# Patient Record
Sex: MALE | Race: WHITE | NOT HISPANIC OR LATINO
[De-identification: names, ages, dates, MRNs, and addresses within clinical notes are randomized per-mention and may not be internally consistent; named-entity substitution may affect disease eponyms.]

---

## 2017-01-09 ENCOUNTER — APPOINTMENT (OUTPATIENT)
Dept: UROLOGY | Facility: CLINIC | Age: 72
End: 2017-01-09

## 2017-01-09 ENCOUNTER — OUTPATIENT (OUTPATIENT)
Dept: OUTPATIENT SERVICES | Facility: HOSPITAL | Age: 72
LOS: 1 days | End: 2017-01-09
Payer: COMMERCIAL

## 2017-01-09 DIAGNOSIS — R35.0 FREQUENCY OF MICTURITION: ICD-10-CM

## 2017-01-09 PROCEDURE — 52000 CYSTOURETHROSCOPY: CPT

## 2017-01-17 DIAGNOSIS — C67.9 MALIGNANT NEOPLASM OF BLADDER, UNSPECIFIED: ICD-10-CM

## 2017-03-03 ENCOUNTER — APPOINTMENT (OUTPATIENT)
Dept: RADIOLOGY | Facility: CLINIC | Age: 72
End: 2017-03-03

## 2017-03-03 ENCOUNTER — APPOINTMENT (OUTPATIENT)
Dept: INTERNAL MEDICINE | Facility: CLINIC | Age: 72
End: 2017-03-03
Payer: MEDICARE

## 2017-03-03 VITALS
DIASTOLIC BLOOD PRESSURE: 80 MMHG | TEMPERATURE: 97.8 F | BODY MASS INDEX: 27.74 KG/M2 | HEIGHT: 68 IN | RESPIRATION RATE: 17 BRPM | WEIGHT: 183 LBS | HEART RATE: 58 BPM | SYSTOLIC BLOOD PRESSURE: 151 MMHG | OXYGEN SATURATION: 95 %

## 2017-03-03 DIAGNOSIS — F17.200 NICOTINE DEPENDENCE, UNSPECIFIED, UNCOMPLICATED: ICD-10-CM

## 2017-03-03 DIAGNOSIS — I25.10 ATHEROSCLEROTIC HEART DISEASE OF NATIVE CORONARY ARTERY W/OUT ANGINA PECTORIS: ICD-10-CM

## 2017-03-03 PROCEDURE — 99387 INIT PM E/M NEW PAT 65+ YRS: CPT

## 2017-03-03 RX ORDER — CLOPIDOGREL 75 MG/1
75 TABLET, FILM COATED ORAL
Refills: 0 | Status: ACTIVE | COMMUNITY

## 2017-03-03 RX ORDER — ASPIRIN ENTERIC COATED TABLETS 81 MG 81 MG/1
81 TABLET, DELAYED RELEASE ORAL
Refills: 0 | Status: ACTIVE | COMMUNITY

## 2017-03-03 RX ORDER — METOPROLOL TARTRATE 25 MG/1
25 TABLET, FILM COATED ORAL
Refills: 0 | Status: ACTIVE | COMMUNITY

## 2017-03-03 RX ORDER — ATORVASTATIN CALCIUM 10 MG/1
10 TABLET, FILM COATED ORAL
Refills: 0 | Status: ACTIVE | COMMUNITY

## 2017-03-06 LAB
25(OH)D3 SERPL-MCNC: 33.5 NG/ML
ALBUMIN SERPL ELPH-MCNC: 4.6 G/DL
ALP BLD-CCNC: 75 U/L
ALT SERPL-CCNC: 23 U/L
ANION GAP SERPL CALC-SCNC: 14 MMOL/L
AST SERPL-CCNC: 16 U/L
BASOPHILS # BLD AUTO: 0.02 K/UL
BASOPHILS NFR BLD AUTO: 0.3 %
BILIRUB SERPL-MCNC: 0.5 MG/DL
BUN SERPL-MCNC: 23 MG/DL
CALCIUM SERPL-MCNC: 9.9 MG/DL
CHLORIDE SERPL-SCNC: 103 MMOL/L
CHOLEST SERPL-MCNC: 157 MG/DL
CHOLEST/HDLC SERPL: 4 RATIO
CO2 SERPL-SCNC: 28 MMOL/L
CREAT SERPL-MCNC: 0.95 MG/DL
EOSINOPHIL # BLD AUTO: 0.09 K/UL
EOSINOPHIL NFR BLD AUTO: 1.2 %
GLUCOSE SERPL-MCNC: 118 MG/DL
HBA1C MFR BLD HPLC: 6.8 %
HCT VFR BLD CALC: 48.9 %
HCV AB SER QL: NONREACTIVE
HCV S/CO RATIO: 0.18 S/CO
HDLC SERPL-MCNC: 39 MG/DL
HGB BLD-MCNC: 16.7 G/DL
IMM GRANULOCYTES NFR BLD AUTO: 0.3 %
LDLC SERPL CALC-MCNC: 92 MG/DL
LYMPHOCYTES # BLD AUTO: 2.11 K/UL
LYMPHOCYTES NFR BLD AUTO: 27.7 %
MAN DIFF?: NORMAL
MCHC RBC-ENTMCNC: 32 PG
MCHC RBC-ENTMCNC: 34.2 GM/DL
MCV RBC AUTO: 93.7 FL
MONOCYTES # BLD AUTO: 0.63 K/UL
MONOCYTES NFR BLD AUTO: 8.3 %
NEUTROPHILS # BLD AUTO: 4.74 K/UL
NEUTROPHILS NFR BLD AUTO: 62.2 %
PLATELET # BLD AUTO: 189 K/UL
POTASSIUM SERPL-SCNC: 4.1 MMOL/L
PROT SERPL-MCNC: 7.3 G/DL
PSA SERPL-MCNC: 2.25 NG/ML
RBC # BLD: 5.22 M/UL
RBC # FLD: 13.6 %
SODIUM SERPL-SCNC: 144 MMOL/L
T4 FREE SERPL-MCNC: 1.2 NG/DL
TRIGL SERPL-MCNC: 132 MG/DL
TSH SERPL-ACNC: 2.7 UIU/ML
VIT B12 SERPL-MCNC: 512 PG/ML
WBC # FLD AUTO: 7.61 K/UL

## 2017-03-07 ENCOUNTER — APPOINTMENT (OUTPATIENT)
Dept: RADIOLOGY | Facility: CLINIC | Age: 72
End: 2017-03-07

## 2017-06-21 ENCOUNTER — APPOINTMENT (OUTPATIENT)
Dept: UROLOGY | Facility: CLINIC | Age: 72
End: 2017-06-21

## 2017-06-21 VITALS
HEART RATE: 60 BPM | DIASTOLIC BLOOD PRESSURE: 77 MMHG | TEMPERATURE: 97.8 F | HEIGHT: 68 IN | SYSTOLIC BLOOD PRESSURE: 144 MMHG | RESPIRATION RATE: 17 BRPM | BODY MASS INDEX: 26.83 KG/M2 | WEIGHT: 177 LBS

## 2017-06-28 ENCOUNTER — APPOINTMENT (OUTPATIENT)
Dept: UROLOGY | Facility: CLINIC | Age: 72
End: 2017-06-28

## 2017-06-28 ENCOUNTER — OUTPATIENT (OUTPATIENT)
Dept: OUTPATIENT SERVICES | Facility: HOSPITAL | Age: 72
LOS: 1 days | End: 2017-06-28
Payer: COMMERCIAL

## 2017-06-28 DIAGNOSIS — R35.0 FREQUENCY OF MICTURITION: ICD-10-CM

## 2017-06-28 PROCEDURE — 52000 CYSTOURETHROSCOPY: CPT

## 2017-06-30 DIAGNOSIS — C67.9 MALIGNANT NEOPLASM OF BLADDER, UNSPECIFIED: ICD-10-CM

## 2017-07-03 ENCOUNTER — APPOINTMENT (OUTPATIENT)
Dept: UROLOGY | Facility: CLINIC | Age: 72
End: 2017-07-03

## 2017-07-10 LAB — CORE LAB FLUID CYTOLOGY: NORMAL

## 2018-02-01 ENCOUNTER — LABORATORY RESULT (OUTPATIENT)
Age: 73
End: 2018-02-01

## 2018-02-01 ENCOUNTER — APPOINTMENT (OUTPATIENT)
Dept: INTERNAL MEDICINE | Facility: CLINIC | Age: 73
End: 2018-02-01
Payer: MEDICARE

## 2018-02-01 VITALS
SYSTOLIC BLOOD PRESSURE: 140 MMHG | WEIGHT: 181 LBS | DIASTOLIC BLOOD PRESSURE: 72 MMHG | TEMPERATURE: 97.4 F | HEIGHT: 68 IN | BODY MASS INDEX: 27.43 KG/M2

## 2018-02-01 DIAGNOSIS — I83.90 ASYMPTOMATIC VARICOSE VEINS OF UNSPECIFIED LOWER EXTREMITY: ICD-10-CM

## 2018-02-01 DIAGNOSIS — R12 HEARTBURN: ICD-10-CM

## 2018-02-01 DIAGNOSIS — R05 COUGH: ICD-10-CM

## 2018-02-01 DIAGNOSIS — Z23 ENCOUNTER FOR IMMUNIZATION: ICD-10-CM

## 2018-02-01 PROCEDURE — 36415 COLL VENOUS BLD VENIPUNCTURE: CPT

## 2018-02-01 PROCEDURE — 90670 PCV13 VACCINE IM: CPT

## 2018-02-01 PROCEDURE — G0439: CPT | Mod: 25

## 2018-02-01 PROCEDURE — 99387 INIT PM E/M NEW PAT 65+ YRS: CPT | Mod: 25

## 2018-02-01 PROCEDURE — 93000 ELECTROCARDIOGRAM COMPLETE: CPT

## 2018-02-01 PROCEDURE — G0009: CPT

## 2018-02-03 LAB
25(OH)D3 SERPL-MCNC: 37.1 NG/ML
ALBUMIN SERPL ELPH-MCNC: 4.4 G/DL
ALP BLD-CCNC: 69 U/L
ALT SERPL-CCNC: 22 U/L
ANION GAP SERPL CALC-SCNC: 14 MMOL/L
APPEARANCE: CLEAR
AST SERPL-CCNC: 17 U/L
BASOPHILS # BLD AUTO: 0.02 K/UL
BASOPHILS NFR BLD AUTO: 0.3 %
BILIRUB SERPL-MCNC: 0.5 MG/DL
BILIRUBIN URINE: NEGATIVE
BLOOD URINE: NEGATIVE
BUN SERPL-MCNC: 19 MG/DL
CALCIUM SERPL-MCNC: 10.1 MG/DL
CHLORIDE SERPL-SCNC: 100 MMOL/L
CHOLEST SERPL-MCNC: 165 MG/DL
CHOLEST/HDLC SERPL: 4.7 RATIO
CO2 SERPL-SCNC: 26 MMOL/L
COLOR: ABNORMAL
CREAT SERPL-MCNC: 1.04 MG/DL
EOSINOPHIL # BLD AUTO: 0.13 K/UL
EOSINOPHIL NFR BLD AUTO: 1.6 %
ERYTHROCYTE [SEDIMENTATION RATE] IN BLOOD BY WESTERGREN METHOD: 7 MM/HR
GLUCOSE QUALITATIVE U: NEGATIVE MG/DL
GLUCOSE SERPL-MCNC: 137 MG/DL
HBA1C MFR BLD HPLC: 6.1 %
HCT VFR BLD CALC: 49.9 %
HDLC SERPL-MCNC: 35 MG/DL
HGB BLD-MCNC: 16.1 G/DL
IMM GRANULOCYTES NFR BLD AUTO: 0.3 %
KETONES URINE: ABNORMAL
LDLC SERPL CALC-MCNC: 97 MG/DL
LEUKOCYTE ESTERASE URINE: NEGATIVE
LYMPHOCYTES # BLD AUTO: 2.45 K/UL
LYMPHOCYTES NFR BLD AUTO: 30.6 %
MAN DIFF?: NORMAL
MCHC RBC-ENTMCNC: 30.7 PG
MCHC RBC-ENTMCNC: 32.3 GM/DL
MCV RBC AUTO: 95.2 FL
MONOCYTES # BLD AUTO: 0.72 K/UL
MONOCYTES NFR BLD AUTO: 9 %
NEUTROPHILS # BLD AUTO: 4.66 K/UL
NEUTROPHILS NFR BLD AUTO: 58.2 %
NITRITE URINE: NEGATIVE
PH URINE: 5.5
PLATELET # BLD AUTO: 208 K/UL
POTASSIUM SERPL-SCNC: 4.4 MMOL/L
PROT SERPL-MCNC: 7.5 G/DL
PROTEIN URINE: NEGATIVE MG/DL
PSA SERPL-MCNC: 1.85 NG/ML
RBC # BLD: 5.24 M/UL
RBC # FLD: 13.5 %
SAVE SPECIMEN: NORMAL
SODIUM SERPL-SCNC: 140 MMOL/L
SPECIFIC GRAVITY URINE: 1.03
T3FREE SERPL-MCNC: 3.18 PG/ML
T4 FREE SERPL-MCNC: 1.2 NG/DL
TRIGL SERPL-MCNC: 165 MG/DL
TSH SERPL-ACNC: 3.74 UIU/ML
UROBILINOGEN URINE: NEGATIVE MG/DL
WBC # FLD AUTO: 8 K/UL

## 2018-02-14 ENCOUNTER — APPOINTMENT (OUTPATIENT)
Dept: VASCULAR SURGERY | Facility: CLINIC | Age: 73
End: 2018-02-14
Payer: MEDICARE

## 2018-02-14 VITALS
DIASTOLIC BLOOD PRESSURE: 85 MMHG | HEART RATE: 60 BPM | SYSTOLIC BLOOD PRESSURE: 133 MMHG | TEMPERATURE: 97.6 F | WEIGHT: 180 LBS | HEIGHT: 68 IN | BODY MASS INDEX: 27.28 KG/M2

## 2018-02-14 PROCEDURE — 99204 OFFICE O/P NEW MOD 45 MIN: CPT

## 2018-02-14 PROCEDURE — 99244 OFF/OP CNSLTJ NEW/EST MOD 40: CPT

## 2018-02-15 ENCOUNTER — FORM ENCOUNTER (OUTPATIENT)
Age: 73
End: 2018-02-15

## 2018-02-16 ENCOUNTER — OUTPATIENT (OUTPATIENT)
Dept: OUTPATIENT SERVICES | Facility: HOSPITAL | Age: 73
LOS: 1 days | End: 2018-02-16
Payer: MEDICARE

## 2018-02-16 ENCOUNTER — APPOINTMENT (OUTPATIENT)
Dept: RADIOLOGY | Facility: CLINIC | Age: 73
End: 2018-02-16
Payer: MEDICARE

## 2018-02-16 DIAGNOSIS — F17.200 NICOTINE DEPENDENCE, UNSPECIFIED, UNCOMPLICATED: ICD-10-CM

## 2018-02-16 PROCEDURE — 71046 X-RAY EXAM CHEST 2 VIEWS: CPT | Mod: 26

## 2018-02-16 PROCEDURE — 71046 X-RAY EXAM CHEST 2 VIEWS: CPT

## 2018-03-19 ENCOUNTER — APPOINTMENT (OUTPATIENT)
Dept: VASCULAR SURGERY | Facility: CLINIC | Age: 73
End: 2018-03-19
Payer: MEDICARE

## 2018-03-19 ENCOUNTER — MOBILE ON CALL (OUTPATIENT)
Age: 73
End: 2018-03-19

## 2018-03-19 VITALS
TEMPERATURE: 97.6 F | WEIGHT: 180 LBS | DIASTOLIC BLOOD PRESSURE: 69 MMHG | BODY MASS INDEX: 27.28 KG/M2 | HEIGHT: 68 IN | HEART RATE: 56 BPM | SYSTOLIC BLOOD PRESSURE: 156 MMHG

## 2018-03-19 PROCEDURE — 93970 EXTREMITY STUDY: CPT

## 2018-03-19 PROCEDURE — 99213 OFFICE O/P EST LOW 20 MIN: CPT

## 2018-05-29 ENCOUNTER — RX RENEWAL (OUTPATIENT)
Age: 73
End: 2018-05-29

## 2018-05-29 DIAGNOSIS — I83.93 ASYMPTOMATIC VARICOSE VEINS OF BILATERAL LOWER EXTREMITIES: ICD-10-CM

## 2018-06-04 ENCOUNTER — APPOINTMENT (OUTPATIENT)
Dept: VASCULAR SURGERY | Facility: CLINIC | Age: 73
End: 2018-06-04
Payer: MEDICARE

## 2018-06-04 PROCEDURE — 36475 ENDOVENOUS RF 1ST VEIN: CPT | Mod: LT

## 2018-06-08 ENCOUNTER — APPOINTMENT (OUTPATIENT)
Dept: VASCULAR SURGERY | Facility: CLINIC | Age: 73
End: 2018-06-08
Payer: MEDICARE

## 2018-06-08 PROCEDURE — 93971 EXTREMITY STUDY: CPT

## 2018-06-11 ENCOUNTER — RX RENEWAL (OUTPATIENT)
Age: 73
End: 2018-06-11

## 2018-06-13 ENCOUNTER — RX RENEWAL (OUTPATIENT)
Age: 73
End: 2018-06-13

## 2018-06-15 ENCOUNTER — APPOINTMENT (OUTPATIENT)
Dept: VASCULAR SURGERY | Facility: CLINIC | Age: 73
End: 2018-06-15
Payer: MEDICARE

## 2018-06-15 PROCEDURE — 37765 STAB PHLEB VEINS XTR 10-20: CPT | Mod: LT

## 2018-06-18 ENCOUNTER — APPOINTMENT (OUTPATIENT)
Dept: VASCULAR SURGERY | Facility: CLINIC | Age: 73
End: 2018-06-18
Payer: MEDICARE

## 2018-06-18 PROCEDURE — 37765 STAB PHLEB VEINS XTR 10-20: CPT | Mod: 79,RT

## 2018-06-27 ENCOUNTER — APPOINTMENT (OUTPATIENT)
Dept: UROLOGY | Facility: CLINIC | Age: 73
End: 2018-06-27
Payer: MEDICARE

## 2018-06-27 PROCEDURE — 99213 OFFICE O/P EST LOW 20 MIN: CPT

## 2018-07-02 ENCOUNTER — OUTPATIENT (OUTPATIENT)
Dept: OUTPATIENT SERVICES | Facility: HOSPITAL | Age: 73
LOS: 1 days | End: 2018-07-02
Payer: MEDICARE

## 2018-07-02 ENCOUNTER — APPOINTMENT (OUTPATIENT)
Dept: UROLOGY | Facility: CLINIC | Age: 73
End: 2018-07-02
Payer: MEDICARE

## 2018-07-02 VITALS
SYSTOLIC BLOOD PRESSURE: 163 MMHG | RESPIRATION RATE: 17 BRPM | DIASTOLIC BLOOD PRESSURE: 73 MMHG | TEMPERATURE: 97.7 F | HEART RATE: 60 BPM

## 2018-07-02 PROCEDURE — 52000 CYSTOURETHROSCOPY: CPT

## 2018-07-06 DIAGNOSIS — C67.4 MALIGNANT NEOPLASM OF POSTERIOR WALL OF BLADDER: ICD-10-CM

## 2018-07-11 ENCOUNTER — APPOINTMENT (OUTPATIENT)
Dept: VASCULAR SURGERY | Facility: CLINIC | Age: 73
End: 2018-07-11
Payer: MEDICARE

## 2018-07-11 VITALS
SYSTOLIC BLOOD PRESSURE: 151 MMHG | HEART RATE: 57 BPM | DIASTOLIC BLOOD PRESSURE: 82 MMHG | BODY MASS INDEX: 27.28 KG/M2 | TEMPERATURE: 97.9 F | HEIGHT: 68 IN | WEIGHT: 180 LBS

## 2018-07-11 PROCEDURE — 99024 POSTOP FOLLOW-UP VISIT: CPT

## 2018-07-11 RX ORDER — ALPRAZOLAM 0.25 MG/1
0.25 TABLET ORAL
Qty: 1 | Refills: 0 | Status: COMPLETED | COMMUNITY
Start: 2018-05-29 | End: 2018-06-18

## 2018-07-16 ENCOUNTER — APPOINTMENT (OUTPATIENT)
Dept: UROLOGY | Facility: CLINIC | Age: 73
End: 2018-07-16

## 2018-09-06 ENCOUNTER — APPOINTMENT (OUTPATIENT)
Dept: INTERNAL MEDICINE | Facility: CLINIC | Age: 73
End: 2018-09-06
Payer: MEDICARE

## 2018-09-06 VITALS
WEIGHT: 180 LBS | SYSTOLIC BLOOD PRESSURE: 120 MMHG | HEIGHT: 68 IN | DIASTOLIC BLOOD PRESSURE: 70 MMHG | TEMPERATURE: 97.7 F | BODY MASS INDEX: 27.28 KG/M2

## 2018-09-06 DIAGNOSIS — R10.9 UNSPECIFIED ABDOMINAL PAIN: ICD-10-CM

## 2018-09-06 DIAGNOSIS — R07.81 PLEURODYNIA: ICD-10-CM

## 2018-09-06 PROCEDURE — 81002 URINALYSIS NONAUTO W/O SCOPE: CPT

## 2018-09-06 PROCEDURE — 36415 COLL VENOUS BLD VENIPUNCTURE: CPT

## 2018-09-06 PROCEDURE — 99214 OFFICE O/P EST MOD 30 MIN: CPT | Mod: 25

## 2018-09-10 ENCOUNTER — APPOINTMENT (OUTPATIENT)
Dept: RADIOLOGY | Facility: CLINIC | Age: 73
End: 2018-09-10
Payer: MEDICARE

## 2018-09-10 ENCOUNTER — OUTPATIENT (OUTPATIENT)
Dept: OUTPATIENT SERVICES | Facility: HOSPITAL | Age: 73
LOS: 1 days | End: 2018-09-10
Payer: MEDICARE

## 2018-09-10 ENCOUNTER — APPOINTMENT (OUTPATIENT)
Dept: ULTRASOUND IMAGING | Facility: CLINIC | Age: 73
End: 2018-09-10
Payer: MEDICARE

## 2018-09-10 DIAGNOSIS — Z00.8 ENCOUNTER FOR OTHER GENERAL EXAMINATION: ICD-10-CM

## 2018-09-10 PROCEDURE — 71100 X-RAY EXAM RIBS UNI 2 VIEWS: CPT | Mod: 26,LT

## 2018-09-10 PROCEDURE — 76700 US EXAM ABDOM COMPLETE: CPT

## 2018-09-10 PROCEDURE — 76700 US EXAM ABDOM COMPLETE: CPT | Mod: 26

## 2018-09-10 PROCEDURE — 71100 X-RAY EXAM RIBS UNI 2 VIEWS: CPT

## 2018-09-11 LAB
ALBUMIN SERPL ELPH-MCNC: 4.2 G/DL
ALP BLD-CCNC: 67 U/L
ALT SERPL-CCNC: 15 U/L
ANION GAP SERPL CALC-SCNC: 14 MMOL/L
APPEARANCE: CLEAR
AST SERPL-CCNC: 16 U/L
BACTERIA: NEGATIVE
BILIRUB SERPL-MCNC: 0.5 MG/DL
BILIRUB UR QL STRIP: ABNORMAL
BILIRUBIN URINE: ABNORMAL
BLOOD URINE: NEGATIVE
BUN SERPL-MCNC: 29 MG/DL
CALCIUM SERPL-MCNC: 9.8 MG/DL
CHLORIDE SERPL-SCNC: 104 MMOL/L
CO2 SERPL-SCNC: 26 MMOL/L
COLOR: ABNORMAL
CREAT SERPL-MCNC: 0.93 MG/DL
GLUCOSE QUALITATIVE U: NEGATIVE MG/DL
GLUCOSE SERPL-MCNC: 128 MG/DL
GLUCOSE UR-MCNC: NEGATIVE
HCG UR QL: 1 EU/DL
HGB UR QL STRIP.AUTO: ABNORMAL
HYALINE CASTS: 4 /LPF
KETONES UR-MCNC: NEGATIVE
KETONES URINE: ABNORMAL
LEUKOCYTE ESTERASE UR QL STRIP: NEGATIVE
LEUKOCYTE ESTERASE URINE: NEGATIVE
MICROSCOPIC-UA: NORMAL
NITRITE UR QL STRIP: NEGATIVE
NITRITE URINE: NEGATIVE
PH UR STRIP: 6
PH URINE: 5.5
POTASSIUM SERPL-SCNC: 4.5 MMOL/L
PROT SERPL-MCNC: 7.2 G/DL
PROT UR STRIP-MCNC: ABNORMAL
PROTEIN URINE: ABNORMAL MG/DL
RED BLOOD CELLS URINE: 8 /HPF
SODIUM SERPL-SCNC: 144 MMOL/L
SP GR UR STRIP: 1.02
SPECIFIC GRAVITY URINE: 1.03
SQUAMOUS EPITHELIAL CELLS: 2 /HPF
UROBILINOGEN URINE: 1 MG/DL
WHITE BLOOD CELLS URINE: 2 /HPF

## 2018-09-12 ENCOUNTER — OTHER (OUTPATIENT)
Age: 73
End: 2018-09-12

## 2018-09-12 ENCOUNTER — APPOINTMENT (OUTPATIENT)
Dept: UROLOGY | Facility: CLINIC | Age: 73
End: 2018-09-12
Payer: MEDICARE

## 2018-09-12 PROCEDURE — 99213 OFFICE O/P EST LOW 20 MIN: CPT

## 2018-09-13 ENCOUNTER — FORM ENCOUNTER (OUTPATIENT)
Age: 73
End: 2018-09-13

## 2018-09-13 LAB
APPEARANCE: CLEAR
BACTERIA: NEGATIVE
BILIRUBIN URINE: NEGATIVE
BLOOD URINE: NEGATIVE
COLOR: YELLOW
GLUCOSE QUALITATIVE U: NEGATIVE MG/DL
HYALINE CASTS: 0 /LPF
KETONES URINE: NEGATIVE
LEUKOCYTE ESTERASE URINE: NEGATIVE
MICROSCOPIC-UA: NORMAL
NITRITE URINE: NEGATIVE
PH URINE: 7.5
PROTEIN URINE: NEGATIVE MG/DL
RED BLOOD CELLS URINE: 1 /HPF
SPECIFIC GRAVITY URINE: 1.02
SQUAMOUS EPITHELIAL CELLS: 0 /HPF
UROBILINOGEN URINE: 1 MG/DL
WHITE BLOOD CELLS URINE: 1 /HPF

## 2018-09-14 ENCOUNTER — APPOINTMENT (OUTPATIENT)
Dept: CT IMAGING | Facility: IMAGING CENTER | Age: 73
End: 2018-09-14
Payer: MEDICARE

## 2018-09-14 ENCOUNTER — OUTPATIENT (OUTPATIENT)
Dept: OUTPATIENT SERVICES | Facility: HOSPITAL | Age: 73
LOS: 1 days | End: 2018-09-14
Payer: MEDICARE

## 2018-09-14 DIAGNOSIS — R31.0 GROSS HEMATURIA: ICD-10-CM

## 2018-09-14 DIAGNOSIS — C67.9 MALIGNANT NEOPLASM OF BLADDER, UNSPECIFIED: ICD-10-CM

## 2018-09-14 LAB — BACTERIA UR CULT: NORMAL

## 2018-09-14 PROCEDURE — 74178 CT ABD&PLV WO CNTR FLWD CNTR: CPT

## 2018-09-14 PROCEDURE — 74178 CT ABD&PLV WO CNTR FLWD CNTR: CPT | Mod: 26

## 2019-06-03 ENCOUNTER — APPOINTMENT (OUTPATIENT)
Dept: UROLOGY | Facility: CLINIC | Age: 74
End: 2019-06-03
Payer: MEDICARE

## 2019-06-03 ENCOUNTER — OUTPATIENT (OUTPATIENT)
Dept: OUTPATIENT SERVICES | Facility: HOSPITAL | Age: 74
LOS: 1 days | End: 2019-06-03
Payer: MEDICARE

## 2019-06-03 VITALS
OXYGEN SATURATION: 95 % | DIASTOLIC BLOOD PRESSURE: 69 MMHG | SYSTOLIC BLOOD PRESSURE: 154 MMHG | RESPIRATION RATE: 17 BRPM | TEMPERATURE: 97.7 F | BODY MASS INDEX: 27.28 KG/M2 | HEART RATE: 62 BPM | WEIGHT: 180 LBS | HEIGHT: 68 IN

## 2019-06-03 DIAGNOSIS — R35.0 FREQUENCY OF MICTURITION: ICD-10-CM

## 2019-06-03 PROCEDURE — 52000 CYSTOURETHROSCOPY: CPT

## 2019-06-10 DIAGNOSIS — C67.9 MALIGNANT NEOPLASM OF BLADDER, UNSPECIFIED: ICD-10-CM

## 2020-03-18 ENCOUNTER — APPOINTMENT (OUTPATIENT)
Dept: INTERNAL MEDICINE | Facility: CLINIC | Age: 75
End: 2020-03-18

## 2020-03-26 ENCOUNTER — APPOINTMENT (OUTPATIENT)
Dept: INTERNAL MEDICINE | Facility: CLINIC | Age: 75
End: 2020-03-26

## 2020-04-24 ENCOUNTER — APPOINTMENT (OUTPATIENT)
Dept: INTERNAL MEDICINE | Facility: CLINIC | Age: 75
End: 2020-04-24
Payer: MEDICARE

## 2020-04-24 PROCEDURE — 99213 OFFICE O/P EST LOW 20 MIN: CPT | Mod: 95

## 2020-04-30 ENCOUNTER — NON-APPOINTMENT (OUTPATIENT)
Age: 75
End: 2020-04-30

## 2020-04-30 ENCOUNTER — APPOINTMENT (OUTPATIENT)
Dept: INTERNAL MEDICINE | Facility: CLINIC | Age: 75
End: 2020-04-30
Payer: MEDICARE

## 2020-04-30 VITALS
TEMPERATURE: 96.7 F | HEIGHT: 68 IN | DIASTOLIC BLOOD PRESSURE: 80 MMHG | WEIGHT: 195 LBS | OXYGEN SATURATION: 96 % | SYSTOLIC BLOOD PRESSURE: 130 MMHG | BODY MASS INDEX: 29.55 KG/M2 | HEART RATE: 63 BPM

## 2020-04-30 PROCEDURE — 99214 OFFICE O/P EST MOD 30 MIN: CPT | Mod: 25

## 2020-04-30 PROCEDURE — 93000 ELECTROCARDIOGRAM COMPLETE: CPT

## 2020-04-30 PROCEDURE — 36415 COLL VENOUS BLD VENIPUNCTURE: CPT

## 2020-05-05 ENCOUNTER — APPOINTMENT (OUTPATIENT)
Dept: INTERNAL MEDICINE | Facility: CLINIC | Age: 75
End: 2020-05-05
Payer: MEDICARE

## 2020-05-05 LAB
ALBUMIN SERPL ELPH-MCNC: 4.3 G/DL
ALP BLD-CCNC: 70 U/L
ALT SERPL-CCNC: 21 U/L
ANION GAP SERPL CALC-SCNC: 12 MMOL/L
AST SERPL-CCNC: 13 U/L
BASOPHILS # BLD AUTO: 0.04 K/UL
BASOPHILS NFR BLD AUTO: 0.6 %
BILIRUB SERPL-MCNC: 0.7 MG/DL
BUN SERPL-MCNC: 28 MG/DL
CALCIUM SERPL-MCNC: 9.7 MG/DL
CHLORIDE SERPL-SCNC: 105 MMOL/L
CO2 SERPL-SCNC: 28 MMOL/L
CREAT SERPL-MCNC: 1.02 MG/DL
EOSINOPHIL # BLD AUTO: 0.08 K/UL
EOSINOPHIL NFR BLD AUTO: 1.1 %
ESTIMATED AVERAGE GLUCOSE: 146 MG/DL
GLUCOSE SERPL-MCNC: 164 MG/DL
HBA1C MFR BLD HPLC: 6.7 %
HCT VFR BLD CALC: 45.6 %
HGB BLD-MCNC: 15.1 G/DL
IMM GRANULOCYTES NFR BLD AUTO: 0.7 %
LYMPHOCYTES # BLD AUTO: 1.96 K/UL
LYMPHOCYTES NFR BLD AUTO: 28.2 %
MAN DIFF?: NORMAL
MCHC RBC-ENTMCNC: 30.1 PG
MCHC RBC-ENTMCNC: 33.1 GM/DL
MCV RBC AUTO: 90.8 FL
MONOCYTES # BLD AUTO: 0.56 K/UL
MONOCYTES NFR BLD AUTO: 8 %
NEUTROPHILS # BLD AUTO: 4.27 K/UL
NEUTROPHILS NFR BLD AUTO: 61.4 %
PLATELET # BLD AUTO: 202 K/UL
POTASSIUM SERPL-SCNC: 4.6 MMOL/L
PROT SERPL-MCNC: 6.7 G/DL
RBC # BLD: 5.02 M/UL
RBC # FLD: 13.2 %
SODIUM SERPL-SCNC: 145 MMOL/L
WBC # FLD AUTO: 6.96 K/UL

## 2020-05-05 PROCEDURE — 99442: CPT

## 2020-05-07 ENCOUNTER — APPOINTMENT (OUTPATIENT)
Dept: INTERNAL MEDICINE | Facility: CLINIC | Age: 75
End: 2020-05-07

## 2020-06-03 ENCOUNTER — APPOINTMENT (OUTPATIENT)
Dept: INTERNAL MEDICINE | Facility: CLINIC | Age: 75
End: 2020-06-03
Payer: MEDICARE

## 2020-06-03 VITALS
HEIGHT: 68 IN | SYSTOLIC BLOOD PRESSURE: 150 MMHG | OXYGEN SATURATION: 97 % | HEART RATE: 64 BPM | DIASTOLIC BLOOD PRESSURE: 70 MMHG | TEMPERATURE: 98.7 F | BODY MASS INDEX: 30.01 KG/M2 | WEIGHT: 198 LBS

## 2020-06-03 DIAGNOSIS — H26.9 UNSPECIFIED CATARACT: ICD-10-CM

## 2020-06-03 PROCEDURE — 99213 OFFICE O/P EST LOW 20 MIN: CPT

## 2020-06-03 PROCEDURE — 99214 OFFICE O/P EST MOD 30 MIN: CPT

## 2020-06-08 ENCOUNTER — APPOINTMENT (OUTPATIENT)
Dept: INTERNAL MEDICINE | Facility: CLINIC | Age: 75
End: 2020-06-08
Payer: MEDICARE

## 2020-06-08 VITALS
SYSTOLIC BLOOD PRESSURE: 140 MMHG | TEMPERATURE: 97.5 F | DIASTOLIC BLOOD PRESSURE: 78 MMHG | WEIGHT: 196 LBS | HEIGHT: 68 IN | BODY MASS INDEX: 29.7 KG/M2 | HEART RATE: 66 BPM | OXYGEN SATURATION: 98 %

## 2020-06-08 VITALS — DIASTOLIC BLOOD PRESSURE: 78 MMHG | SYSTOLIC BLOOD PRESSURE: 138 MMHG

## 2020-06-08 PROCEDURE — 99213 OFFICE O/P EST LOW 20 MIN: CPT

## 2020-06-10 ENCOUNTER — APPOINTMENT (OUTPATIENT)
Dept: UROLOGY | Facility: CLINIC | Age: 75
End: 2020-06-10

## 2020-06-19 ENCOUNTER — APPOINTMENT (OUTPATIENT)
Dept: CARDIOLOGY | Facility: CLINIC | Age: 75
End: 2020-06-19
Payer: MEDICARE

## 2020-06-19 VITALS
BODY MASS INDEX: 30.01 KG/M2 | WEIGHT: 198 LBS | HEART RATE: 54 BPM | OXYGEN SATURATION: 94 % | SYSTOLIC BLOOD PRESSURE: 124 MMHG | DIASTOLIC BLOOD PRESSURE: 66 MMHG | HEIGHT: 68 IN

## 2020-06-19 DIAGNOSIS — Z87.898 PERSONAL HISTORY OF OTHER SPECIFIED CONDITIONS: ICD-10-CM

## 2020-06-19 PROCEDURE — 99204 OFFICE O/P NEW MOD 45 MIN: CPT

## 2020-06-19 RX ORDER — LOSARTAN POTASSIUM AND HYDROCHLOROTHIAZIDE 100; 25 MG/1; MG/1
100-25 TABLET, FILM COATED ORAL
Refills: 0 | Status: DISCONTINUED | COMMUNITY
End: 2020-06-19

## 2020-06-19 RX ORDER — LOSARTAN POTASSIUM 100 MG/1
100 TABLET, FILM COATED ORAL
Refills: 0 | Status: ACTIVE | COMMUNITY
Start: 2020-06-19

## 2020-06-19 RX ORDER — LIDOCAINE HYDROCHLORIDE 10 MG/ML
1 INJECTION, SOLUTION INFILTRATION; PERINEURAL
Qty: 50 | Refills: 0 | Status: DISCONTINUED | COMMUNITY
Start: 2018-05-29 | End: 2020-06-19

## 2020-06-19 RX ORDER — SODIUM CHLORIDE 9 G/ML
0.9 INJECTION, SOLUTION INTRAVENOUS
Qty: 500 | Refills: 0 | Status: DISCONTINUED | COMMUNITY
Start: 2018-05-29 | End: 2020-06-19

## 2020-06-19 RX ORDER — SODIUM BICARBONATE 84 MG/ML
8.4 INJECTION, SOLUTION INTRAVENOUS
Qty: 5 | Refills: 0 | Status: DISCONTINUED | COMMUNITY
Start: 2018-05-29 | End: 2020-06-19

## 2020-06-19 RX ORDER — AMLODIPINE BESYLATE 10 MG/1
10 TABLET ORAL DAILY
Qty: 90 | Refills: 3 | Status: DISCONTINUED | COMMUNITY
End: 2020-06-19

## 2020-06-22 NOTE — ASSESSMENT
[FreeTextEntry1] : 1. PAD on CT As Above\par 2. [place] Class 3 Claudication\par 3. HTN\par 4. CAD s/p PCI\par 5. HLD

## 2020-06-22 NOTE — REASON FOR VISIT
[Initial Evaluation] : an initial evaluation of [FreeTextEntry1] : 75 y/o M Ex-Smoker (Quit Last Year) w/ PMHX HTN, HLD, DM, CAD CAD s/p 5 Stents last at [facility] 10 yrs ago, s/p R eye cataract surgery 6/11/20, for L cataract surgery on 6/25/20, History of Venous Insufficiency s/p B/L GSV ablation and phlebectomy. Patient presents for PAD evaluation. CT 5/2020 showed high grade stenosis of the bilateral internal iliac origin, R CFA and Profunda atherosclerosis, R SFA with focal moderate to severe stenosis, L SFA with with focal occlusion in mid SFA, moderate to severe proximal SFA disease. Patient presents and reports  B/L thigh and calf claudication on ambulating \par 2 blocks, described as a tightness. No leg swelling.No ulcers / tissue loss. Presents for PAD evaluation.\par \par Cardiologist: Conrad Chapman

## 2020-06-22 NOTE — PHYSICAL EXAM
[Normal Conjunctiva] : the conjunctiva exhibited no abnormalities [Heart Sounds] : normal S1 and S2 [Heart Rate And Rhythm] : heart rate and rhythm were normal [Auscultation Breath Sounds / Voice Sounds] : lungs were clear to auscultation bilaterally [Bowel Sounds] : normal bowel sounds [Abdomen Soft] : soft [Abdomen Tenderness] : non-tender [Abnormal Walk] : normal gait [Cyanosis, Localized] : no localized cyanosis [] : no rash [No Skin Ulcers] : no skin ulcer [Oriented To Time, Place, And Person] : oriented to person, place, and time [FreeTextEntry1] : R DP Palpable. R PT Doppler. L DP / PT Doppler

## 2020-06-22 NOTE — REVIEW OF SYSTEMS
[see HPI] : see HPI [Leg Claudication] : intermittent leg claudication [Negative] : Heme/Lymph [Shortness Of Breath] : no shortness of breath [Chest Pain] : no chest pain

## 2020-06-22 NOTE — DISCUSSION/SUMMARY
[FreeTextEntry1] : 1. On ASA/Plavix/Statin\par 2. Obtain LORENA and Arterial Doppler\par 3. Start walking therapy\par     30 - 45 minutes, 5 times per week\par 4.3 Month Follow-up

## 2020-09-08 ENCOUNTER — APPOINTMENT (OUTPATIENT)
Dept: UROLOGY | Facility: CLINIC | Age: 75
End: 2020-09-08
Payer: MEDICARE

## 2020-09-08 ENCOUNTER — OUTPATIENT (OUTPATIENT)
Dept: OUTPATIENT SERVICES | Facility: HOSPITAL | Age: 75
LOS: 1 days | End: 2020-09-08
Payer: MEDICARE

## 2020-09-08 VITALS — TEMPERATURE: 98.7 F

## 2020-09-08 VITALS — HEART RATE: 77 BPM | RESPIRATION RATE: 16 BRPM | DIASTOLIC BLOOD PRESSURE: 67 MMHG | SYSTOLIC BLOOD PRESSURE: 122 MMHG

## 2020-09-08 DIAGNOSIS — I70.213 ATHEROSCLEROSIS OF NATIVE ARTERIES OF EXTREMITIES WITH INTERMITTENT CLAUDICATION, BILATERAL LEGS: ICD-10-CM

## 2020-09-08 DIAGNOSIS — R35.0 FREQUENCY OF MICTURITION: ICD-10-CM

## 2020-09-08 DIAGNOSIS — I73.9 PERIPHERAL VASCULAR DISEASE, UNSPECIFIED: ICD-10-CM

## 2020-09-08 PROCEDURE — 99214 OFFICE O/P EST MOD 30 MIN: CPT | Mod: 25

## 2020-09-08 PROCEDURE — 52000 CYSTOURETHROSCOPY: CPT

## 2020-09-09 ENCOUNTER — APPOINTMENT (OUTPATIENT)
Dept: UROLOGY | Facility: CLINIC | Age: 75
End: 2020-09-09

## 2020-09-13 DIAGNOSIS — A49.9 URINARY TRACT INFECTION, SITE NOT SPECIFIED: ICD-10-CM

## 2020-09-13 DIAGNOSIS — N39.0 URINARY TRACT INFECTION, SITE NOT SPECIFIED: ICD-10-CM

## 2020-09-13 LAB — BACTERIA UR CULT: ABNORMAL

## 2020-09-16 DIAGNOSIS — N40.1 BENIGN PROSTATIC HYPERPLASIA WITH LOWER URINARY TRACT SYMPTOMS: ICD-10-CM

## 2020-09-16 DIAGNOSIS — Z85.51 PERSONAL HISTORY OF MALIGNANT NEOPLASM OF BLADDER: ICD-10-CM

## 2020-10-09 ENCOUNTER — APPOINTMENT (OUTPATIENT)
Dept: CARDIOLOGY | Facility: CLINIC | Age: 75
End: 2020-10-09

## 2020-11-03 ENCOUNTER — OUTPATIENT (OUTPATIENT)
Dept: OUTPATIENT SERVICES | Facility: HOSPITAL | Age: 75
LOS: 1 days | End: 2020-11-03
Payer: MEDICARE

## 2020-11-03 ENCOUNTER — APPOINTMENT (OUTPATIENT)
Dept: CARDIOLOGY | Facility: CLINIC | Age: 75
End: 2020-11-03
Payer: MEDICARE

## 2020-11-03 VITALS — OXYGEN SATURATION: 96 % | HEART RATE: 68 BPM | DIASTOLIC BLOOD PRESSURE: 76 MMHG | SYSTOLIC BLOOD PRESSURE: 151 MMHG

## 2020-11-03 DIAGNOSIS — I70.213 ATHEROSCLEROSIS OF NATIVE ARTERIES OF EXTREMITIES WITH INTERMITTENT CLAUDICATION, BILATERAL LEGS: ICD-10-CM

## 2020-11-03 DIAGNOSIS — I73.9 PERIPHERAL VASCULAR DISEASE, UNSPECIFIED: ICD-10-CM

## 2020-11-03 PROCEDURE — 99072 ADDL SUPL MATRL&STAF TM PHE: CPT

## 2020-11-03 PROCEDURE — 99215 OFFICE O/P EST HI 40 MIN: CPT

## 2020-11-03 PROCEDURE — 93923 UPR/LXTR ART STDY 3+ LVLS: CPT

## 2020-11-03 PROCEDURE — 93925 LOWER EXTREMITY STUDY: CPT | Mod: 26

## 2020-11-03 PROCEDURE — 93925 LOWER EXTREMITY STUDY: CPT

## 2020-11-03 PROCEDURE — 93923 UPR/LXTR ART STDY 3+ LVLS: CPT | Mod: 26

## 2020-11-03 NOTE — REASON FOR VISIT
[Follow-Up - Clinic] : a clinic follow-up of [FreeTextEntry1] : Followup 11/3/2020\par \par He is walking \par Must walk slowly\par Walks with his wife, but he is always behind / slower\par can now walk more than before \par He walks 3 x per week.  Walks 45 minutes at that time\par His walking distance has improved from 2 blocks.  Now he gets symptoms at 3-4 blocks.\par If he walks slowly he can do 10 blocks\par \par Former smoker - stopped 1 year ago\par \par LORENA and arterial duplex not done yet\par \par He cannot enjoy his walks with his wife.  Hard for him to push a mower without having to stop. \par

## 2020-11-03 NOTE — DISCUSSION/SUMMARY
[FreeTextEntry1] : 1. On ASA/Plavix/Statin\par 2. Obtain LORENA and Arterial Doppler\par 3. Continue walking therapy\par     30 - 45 minutes, 5 times per week\par 4.  Will call patient after vascular testing, then will arrange for peripheral angiogram and intervention - will start with the right leg first.\par 5.  Will check lab-work today to assure creatinine is stable. \par

## 2020-11-03 NOTE — PHYSICAL EXAM
[Normal Conjunctiva] : the conjunctiva exhibited no abnormalities [Auscultation Breath Sounds / Voice Sounds] : lungs were clear to auscultation bilaterally [Heart Rate And Rhythm] : heart rate and rhythm were normal [Heart Sounds] : normal S1 and S2 [Bowel Sounds] : normal bowel sounds [Abdomen Soft] : soft [Abdomen Tenderness] : non-tender [Abnormal Walk] : normal gait [Cyanosis, Localized] : no localized cyanosis [] : no rash [No Skin Ulcers] : no skin ulcer [Oriented To Time, Place, And Person] : oriented to person, place, and time [FreeTextEntry1] : R DP Palpable. R PT Doppler. L DP / PT Doppler

## 2020-11-04 LAB
ALBUMIN SERPL ELPH-MCNC: 4.7 G/DL
ALP BLD-CCNC: 78 U/L
ALT SERPL-CCNC: 32 U/L
ANION GAP SERPL CALC-SCNC: 14 MMOL/L
AST SERPL-CCNC: 17 U/L
BASOPHILS # BLD AUTO: 0.03 K/UL
BASOPHILS NFR BLD AUTO: 0.3 %
BILIRUB SERPL-MCNC: 0.5 MG/DL
BUN SERPL-MCNC: 25 MG/DL
CALCIUM SERPL-MCNC: 9.6 MG/DL
CHLORIDE SERPL-SCNC: 102 MMOL/L
CO2 SERPL-SCNC: 26 MMOL/L
CREAT SERPL-MCNC: 0.91 MG/DL
EOSINOPHIL # BLD AUTO: 0.1 K/UL
EOSINOPHIL NFR BLD AUTO: 1.1 %
GLUCOSE SERPL-MCNC: 156 MG/DL
HCT VFR BLD CALC: 46 %
HGB BLD-MCNC: 15 G/DL
IMM GRANULOCYTES NFR BLD AUTO: 0.5 %
LYMPHOCYTES # BLD AUTO: 2.3 K/UL
LYMPHOCYTES NFR BLD AUTO: 26.3 %
MAN DIFF?: NORMAL
MCHC RBC-ENTMCNC: 30.2 PG
MCHC RBC-ENTMCNC: 32.6 GM/DL
MCV RBC AUTO: 92.7 FL
MONOCYTES # BLD AUTO: 0.6 K/UL
MONOCYTES NFR BLD AUTO: 6.9 %
NEUTROPHILS # BLD AUTO: 5.66 K/UL
NEUTROPHILS NFR BLD AUTO: 64.9 %
PLATELET # BLD AUTO: 231 K/UL
POTASSIUM SERPL-SCNC: 4.1 MMOL/L
PROT SERPL-MCNC: 6.9 G/DL
RBC # BLD: 4.96 M/UL
RBC # FLD: 14.1 %
SODIUM SERPL-SCNC: 142 MMOL/L
WBC # FLD AUTO: 8.73 K/UL

## 2020-11-29 ENCOUNTER — OUTPATIENT (OUTPATIENT)
Dept: OUTPATIENT SERVICES | Facility: HOSPITAL | Age: 75
LOS: 1 days | End: 2020-11-29
Payer: MEDICARE

## 2020-11-29 DIAGNOSIS — Z11.59 ENCOUNTER FOR SCREENING FOR OTHER VIRAL DISEASES: ICD-10-CM

## 2020-11-29 LAB — SARS-COV-2 RNA SPEC QL NAA+PROBE: SIGNIFICANT CHANGE UP

## 2020-11-29 PROCEDURE — U0003: CPT

## 2020-12-01 ENCOUNTER — FORM ENCOUNTER (OUTPATIENT)
Age: 75
End: 2020-12-01

## 2020-12-01 DIAGNOSIS — I73.9 PERIPHERAL VASCULAR DISEASE, UNSPECIFIED: ICD-10-CM

## 2020-12-01 DIAGNOSIS — I70.213 ATHEROSCLEROSIS OF NATIVE ARTERIES OF EXTREMITIES WITH INTERMITTENT CLAUDICATION, BILATERAL LEGS: ICD-10-CM

## 2020-12-02 ENCOUNTER — OUTPATIENT (OUTPATIENT)
Dept: OUTPATIENT SERVICES | Facility: HOSPITAL | Age: 75
LOS: 1 days | End: 2020-12-02
Payer: MEDICARE

## 2020-12-02 VITALS
TEMPERATURE: 99 F | WEIGHT: 184.97 LBS | DIASTOLIC BLOOD PRESSURE: 74 MMHG | HEART RATE: 57 BPM | HEIGHT: 68 IN | RESPIRATION RATE: 18 BRPM | SYSTOLIC BLOOD PRESSURE: 170 MMHG | OXYGEN SATURATION: 98 %

## 2020-12-02 VITALS
SYSTOLIC BLOOD PRESSURE: 146 MMHG | RESPIRATION RATE: 16 BRPM | TEMPERATURE: 99 F | HEART RATE: 58 BPM | OXYGEN SATURATION: 95 % | DIASTOLIC BLOOD PRESSURE: 59 MMHG

## 2020-12-02 DIAGNOSIS — I73.9 PERIPHERAL VASCULAR DISEASE, UNSPECIFIED: ICD-10-CM

## 2020-12-02 DIAGNOSIS — I70.213 ATHEROSCLEROSIS OF NATIVE ARTERIES OF EXTREMITIES WITH INTERMITTENT CLAUDICATION, BILATERAL LEGS: ICD-10-CM

## 2020-12-02 LAB
ALBUMIN SERPL ELPH-MCNC: 4.4 G/DL — SIGNIFICANT CHANGE UP (ref 3.3–5)
ALP SERPL-CCNC: 75 U/L — SIGNIFICANT CHANGE UP (ref 40–120)
ALT FLD-CCNC: 21 U/L — SIGNIFICANT CHANGE UP (ref 10–45)
ANION GAP SERPL CALC-SCNC: 13 MMOL/L — SIGNIFICANT CHANGE UP (ref 5–17)
AST SERPL-CCNC: 16 U/L — SIGNIFICANT CHANGE UP (ref 10–40)
BILIRUB SERPL-MCNC: 0.7 MG/DL — SIGNIFICANT CHANGE UP (ref 0.2–1.2)
BUN SERPL-MCNC: 23 MG/DL — SIGNIFICANT CHANGE UP (ref 7–23)
CALCIUM SERPL-MCNC: 9.8 MG/DL — SIGNIFICANT CHANGE UP (ref 8.4–10.5)
CHLORIDE SERPL-SCNC: 104 MMOL/L — SIGNIFICANT CHANGE UP (ref 96–108)
CO2 SERPL-SCNC: 25 MMOL/L — SIGNIFICANT CHANGE UP (ref 22–31)
CREAT SERPL-MCNC: 0.9 MG/DL — SIGNIFICANT CHANGE UP (ref 0.5–1.3)
GLUCOSE BLDC GLUCOMTR-MCNC: 131 MG/DL — HIGH (ref 70–99)
GLUCOSE BLDC GLUCOMTR-MCNC: 143 MG/DL — HIGH (ref 70–99)
GLUCOSE BLDC GLUCOMTR-MCNC: 96 MG/DL — SIGNIFICANT CHANGE UP (ref 70–99)
GLUCOSE SERPL-MCNC: 149 MG/DL — HIGH (ref 70–99)
HCT VFR BLD CALC: 48.2 % — SIGNIFICANT CHANGE UP (ref 39–50)
HGB BLD-MCNC: 15.5 G/DL — SIGNIFICANT CHANGE UP (ref 13–17)
MCHC RBC-ENTMCNC: 30.2 PG — SIGNIFICANT CHANGE UP (ref 27–34)
MCHC RBC-ENTMCNC: 32.2 GM/DL — SIGNIFICANT CHANGE UP (ref 32–36)
MCV RBC AUTO: 93.8 FL — SIGNIFICANT CHANGE UP (ref 80–100)
NRBC # BLD: 0 /100 WBCS — SIGNIFICANT CHANGE UP (ref 0–0)
PLATELET # BLD AUTO: 231 K/UL — SIGNIFICANT CHANGE UP (ref 150–400)
POTASSIUM SERPL-MCNC: 3.9 MMOL/L — SIGNIFICANT CHANGE UP (ref 3.5–5.3)
POTASSIUM SERPL-SCNC: 3.9 MMOL/L — SIGNIFICANT CHANGE UP (ref 3.5–5.3)
PROT SERPL-MCNC: 7.4 G/DL — SIGNIFICANT CHANGE UP (ref 6–8.3)
RBC # BLD: 5.14 M/UL — SIGNIFICANT CHANGE UP (ref 4.2–5.8)
RBC # FLD: 14.3 % — SIGNIFICANT CHANGE UP (ref 10.3–14.5)
SODIUM SERPL-SCNC: 142 MMOL/L — SIGNIFICANT CHANGE UP (ref 135–145)
WBC # BLD: 7.76 K/UL — SIGNIFICANT CHANGE UP (ref 3.8–10.5)
WBC # FLD AUTO: 7.76 K/UL — SIGNIFICANT CHANGE UP (ref 3.8–10.5)

## 2020-12-02 PROCEDURE — 80053 COMPREHEN METABOLIC PANEL: CPT

## 2020-12-02 PROCEDURE — C1887: CPT

## 2020-12-02 PROCEDURE — 75625 CONTRAST EXAM ABDOMINL AORTA: CPT

## 2020-12-02 PROCEDURE — 93005 ELECTROCARDIOGRAM TRACING: CPT

## 2020-12-02 PROCEDURE — 75716 ARTERY X-RAYS ARMS/LEGS: CPT

## 2020-12-02 PROCEDURE — 75716 ARTERY X-RAYS ARMS/LEGS: CPT | Mod: 26,XU

## 2020-12-02 PROCEDURE — C1769: CPT

## 2020-12-02 PROCEDURE — 75710 ARTERY X-RAYS ARM/LEG: CPT

## 2020-12-02 PROCEDURE — 85027 COMPLETE CBC AUTOMATED: CPT

## 2020-12-02 PROCEDURE — C1894: CPT

## 2020-12-02 PROCEDURE — C1725: CPT

## 2020-12-02 PROCEDURE — 82962 GLUCOSE BLOOD TEST: CPT

## 2020-12-02 PROCEDURE — 93010 ELECTROCARDIOGRAM REPORT: CPT | Mod: 77

## 2020-12-02 PROCEDURE — 93010 ELECTROCARDIOGRAM REPORT: CPT

## 2020-12-02 PROCEDURE — 37224: CPT

## 2020-12-02 RX ORDER — HYDROCHLOROTHIAZIDE 25 MG
25 TABLET ORAL DAILY
Refills: 0 | Status: DISCONTINUED | OUTPATIENT
Start: 2020-12-02 | End: 2020-12-16

## 2020-12-02 RX ORDER — ASPIRIN/CALCIUM CARB/MAGNESIUM 324 MG
81 TABLET ORAL DAILY
Refills: 0 | Status: DISCONTINUED | OUTPATIENT
Start: 2020-12-02 | End: 2020-12-16

## 2020-12-02 RX ORDER — DEXTROSE 50 % IN WATER 50 %
12.5 SYRINGE (ML) INTRAVENOUS ONCE
Refills: 0 | Status: DISCONTINUED | OUTPATIENT
Start: 2020-12-02 | End: 2020-12-16

## 2020-12-02 RX ORDER — CLOPIDOGREL BISULFATE 75 MG/1
75 TABLET, FILM COATED ORAL DAILY
Refills: 0 | Status: DISCONTINUED | OUTPATIENT
Start: 2020-12-02 | End: 2020-12-16

## 2020-12-02 RX ORDER — SODIUM CHLORIDE 9 MG/ML
1000 INJECTION, SOLUTION INTRAVENOUS
Refills: 0 | Status: DISCONTINUED | OUTPATIENT
Start: 2020-12-02 | End: 2020-12-16

## 2020-12-02 RX ORDER — DEXTROSE 50 % IN WATER 50 %
25 SYRINGE (ML) INTRAVENOUS ONCE
Refills: 0 | Status: DISCONTINUED | OUTPATIENT
Start: 2020-12-02 | End: 2020-12-16

## 2020-12-02 RX ORDER — LOSARTAN POTASSIUM 100 MG/1
100 TABLET, FILM COATED ORAL DAILY
Refills: 0 | Status: DISCONTINUED | OUTPATIENT
Start: 2020-12-02 | End: 2020-12-16

## 2020-12-02 RX ORDER — GLUCAGON INJECTION, SOLUTION 0.5 MG/.1ML
1 INJECTION, SOLUTION SUBCUTANEOUS ONCE
Refills: 0 | Status: DISCONTINUED | OUTPATIENT
Start: 2020-12-02 | End: 2020-12-16

## 2020-12-02 RX ORDER — DEXTROSE 50 % IN WATER 50 %
15 SYRINGE (ML) INTRAVENOUS ONCE
Refills: 0 | Status: DISCONTINUED | OUTPATIENT
Start: 2020-12-02 | End: 2020-12-16

## 2020-12-02 RX ORDER — ATORVASTATIN CALCIUM 80 MG/1
40 TABLET, FILM COATED ORAL AT BEDTIME
Refills: 0 | Status: DISCONTINUED | OUTPATIENT
Start: 2020-12-02 | End: 2020-12-16

## 2020-12-02 RX ORDER — INSULIN LISPRO 100/ML
VIAL (ML) SUBCUTANEOUS AT BEDTIME
Refills: 0 | Status: DISCONTINUED | OUTPATIENT
Start: 2020-12-02 | End: 2020-12-16

## 2020-12-02 RX ORDER — AMLODIPINE BESYLATE 2.5 MG/1
5 TABLET ORAL DAILY
Refills: 0 | Status: DISCONTINUED | OUTPATIENT
Start: 2020-12-02 | End: 2020-12-16

## 2020-12-02 RX ORDER — INSULIN LISPRO 100/ML
VIAL (ML) SUBCUTANEOUS
Refills: 0 | Status: DISCONTINUED | OUTPATIENT
Start: 2020-12-02 | End: 2020-12-16

## 2020-12-02 RX ORDER — METOPROLOL TARTRATE 50 MG
25 TABLET ORAL DAILY
Refills: 0 | Status: DISCONTINUED | OUTPATIENT
Start: 2020-12-02 | End: 2020-12-16

## 2020-12-02 RX ORDER — METFORMIN HYDROCHLORIDE 850 MG/1
1 TABLET ORAL
Qty: 0 | Refills: 0 | DISCHARGE

## 2020-12-02 RX ADMIN — Medication 81 MILLIGRAM(S): at 09:47

## 2020-12-02 RX ADMIN — CLOPIDOGREL BISULFATE 75 MILLIGRAM(S): 75 TABLET, FILM COATED ORAL at 09:47

## 2020-12-02 NOTE — ASU DISCHARGE PLAN (ADULT/PEDIATRIC) - CARE PROVIDER_API CALL
Seamus Atkins  CARDIOLOGY  [facility] Dept of Cardiology, [address] [place] [address]  Phone: [phone]  Fax: [phone]  Follow Up Time:

## 2020-12-02 NOTE — H&P CARDIOLOGY - HISTORY OF PRESENT ILLNESS
year old male presents with 1 year history of constant urinary frequency and was diagnosed with benign prostatic hypertrophy  .Pt also states he has a bladder tumor.   Pt is scheduled for cystoscopy with transurethral resection of bladder tumor and transurethral resection of prostate on 03/2012.  Pt denies dysuria, hematuria, fever, flank pain and abdominal pain. This is a 75 year old male PMH smoker, DM 2 A1C 6/7 4/2020, HTN, HLD, CAD s/p PCI, bladder CA, PAD ([place] Class 3 Claudication) presents for peripheral angiogram. Pt. evaluated by Dr. Polanco. VA duplex lower extremities 11/3/2020 There are elevated velocities measured in the right distal external iliac, common femoral and deep femoral arteries. On the left there is a stenosis of the deep femoral artery and a stenosis of the superficial femoral artery in the distal thigh. The right brachial blood pressure measures 166 mmHg; the left brachial blood pressure measures 181 mmHg. The right ankle brachial index equals 0.79; the left ankle brachial index equals 0.60. The blood pressure measurements on the right are 148 mmHg the upper thigh, 148 mmHg in the lower thigh, 161 mmHg in the upper calf and at the right ankle 143 mmHg in the posterior tibial artery and 138 mmHg in the dorsal pedis artery.  The blood pressure measurements on the left are 136 mmHg in the upper thigh, 123 mmHg in the lower thigh, 126 mmHg in the upper calf and at the left ankle 106 mmHg and the posterior tibial artery and 108 mmHg in the dorsal pedis.The amplitude of the pulse volume recordings on the right are normal close to normal at all levels from the upper thigh through the toes.Compared to the right, the amplitude of the pulse volume recordings are slightly reduced at all levels from the upper thighs through the metatarsals. Impression: There is bilateral lower extremity arterial vascular disease. The likely location is aortoiliac disease. There may also be disease affecting the trifurcation arteries on the left.  Pt. with complaints of bilateral claudication able to walk 3-4 blocks only.     This is a 75 year old male PMH smoker 50 pack years, DM 2 A1C 6/7 4/2020, HTN, HLD, CAD s/p PCI x 5 stents, BPH, PAD ([place] Class 3 Claudication) presents for peripheral angiogram. Pt. evaluated by Dr. Polanco with complaints of worsening heaviness in legs bilat and pain in feet when walking.  Pt. states the pain begins at buttocks and his legs feel very heavy.  He complains of burning in his feet bilat at rest and is worse at night. VA duplex lower extremities 11/3/2020 There are elevated velocities measured in the right distal external iliac, common femoral and deep femoral arteries. On the left there is a stenosis of the deep femoral artery and a stenosis of the superficial femoral artery in the distal thigh. The right brachial blood pressure measures 166 mmHg; the left brachial blood pressure measures 181 mmHg. The right ankle brachial index equals 0.79; the left ankle brachial index equals 0.60. The blood pressure measurements on the right are 148 mmHg the upper thigh, 148 mmHg in the lower thigh, 161 mmHg in the upper calf and at the right ankle 143 mmHg in the posterior tibial artery and 138 mmHg in the dorsal pedis artery.  The blood pressure measurements on the left are 136 mmHg in the upper thigh, 123 mmHg in the lower thigh, 126 mmHg in the upper calf and at the left ankle 106 mmHg and the posterior tibial artery and 108 mmHg in the dorsal pedis.The amplitude of the pulse volume recordings on the right are normal close to normal at all levels from the upper thigh through the toes. Compared to the right, the amplitude of the pulse volume recordings are slightly reduced at all levels from the upper thighs through the metatarsals. Impression: There is bilateral lower extremity arterial vascular disease. The likely location is aortoiliac disease. There may also be disease affecting the trifurcation arteries on the left.  Pt. with complaints of bilateral claudication able to walk 3-4 blocks only.

## 2020-12-02 NOTE — CHART NOTE - NSCHARTNOTEFT_GEN_A_CORE
s/p peripheral intervention  #7 [place] removed from left femoral artery manual pressure applied X20 minutes.   Pt tolerated well.   Educated re: activity post procedure

## 2020-12-02 NOTE — H&P CARDIOLOGY - NS SC CAGE ALCOHOL CUT DOWN
Called patient.    Patient reports that since EGD has been rescheduled for 11/21/19, she would like to discuss with Destiney the possibility of having other non-invasive tests instead. She says that her proceeding with EGD would be dependent on results of non-invasive testing.  She specifically asked for pancreatic work up - pancreatic enzymes and CT scan for possible bile stones.    Will route to Destiney's office to advise.     no

## 2020-12-02 NOTE — ASU DISCHARGE PLAN (ADULT/PEDIATRIC) - ASU DC SPECIAL INSTRUCTIONSFT
Wound Care:   the day AFTER your procedure remove bandage GENTLY, and clean using  mild soap and gentle warm, water stream, pat dry. leave OPEN to air. YOU MAY SHOWER   DO NOT apply lotions, creams, ointments, powder, perfumes to your incision site  DO NOT SOAK your site for 1 week ( no baths, no pools, no tubs, etc...)  Check  your groin and /or wrist daily. A small amount of bruising, and soreness are normal    ACTIVITY: for 24 hours   - DO NOT DRIVE  - DO NOT make any important decisions or sign legal documents   - DO NOT operate heavy machineries   - you may resume sexual activity in 48 hours, unless otherwise instructed by your cardiologist     If your procedure was done through the [facility]: for the NEXT 5 DAYS  - Limit climbing stairs, DO NOT soak in bathtub or pool  - no strenuous activities, pushing, pulling, straining  - Do not lift anything 10lbs or heavier     MEDICATION:   take your medications as explained ( see discharge paperwork)   If you received a STENT, you will be taking antiplatelet medications to KEEP YOUR STENT OPEN ( e.g: Aspirin, Plavix, Brilinta, Effient, etc).  Take as prescribed DO NOT STOP taking them without consulting with your cardiologist first.     Follow heart healthy diet recommended by your doctor, , if you smoke STOP SMOKING ( may call [phone] for center of tobacco control if you need assistance)     CALL your doctor to make appointment in 2 WEEKS     ***CALL YOUR DOCTOR***  if you experience: fever, chills, body aches, or severe pain, swelling, redness, heat or yellow discharge at incision site  If you experience Bleeding or excruciating pain at the procedural site, swelling ( golf ball size) at your procedural site  If you experience CHEST PAIN  If you experience extremity numbness, tingling, temperature change ( of your procedural site)   If you are unable to reach your doctor, you may contact:   -Cardiology Office at [facility] at [phone] or   - [facility] [phone]  - [facility] [phone]

## 2020-12-02 NOTE — H&P CARDIOLOGY - PMH
CAD (coronary artery disease)  s/p  angiogram with stent placement 2010   @ [facility]  Claudication    GERD (gastroesophageal reflux disease)    Hyperlipidemia    Hypertension    PAD (peripheral artery disease)

## 2020-12-04 PROBLEM — I73.9 PERIPHERAL VASCULAR DISEASE, UNSPECIFIED: Chronic | Status: ACTIVE | Noted: 2020-12-02

## 2020-12-11 ENCOUNTER — APPOINTMENT (OUTPATIENT)
Dept: CARDIOLOGY | Facility: CLINIC | Age: 75
End: 2020-12-11
Payer: MEDICARE

## 2020-12-11 VITALS
SYSTOLIC BLOOD PRESSURE: 137 MMHG | HEART RATE: 52 BPM | DIASTOLIC BLOOD PRESSURE: 74 MMHG | OXYGEN SATURATION: 96 % | BODY MASS INDEX: 28.04 KG/M2 | HEIGHT: 68 IN | WEIGHT: 185 LBS

## 2020-12-11 PROCEDURE — 99214 OFFICE O/P EST MOD 30 MIN: CPT

## 2020-12-11 PROCEDURE — 99072 ADDL SUPL MATRL&STAF TM PHE: CPT

## 2020-12-11 NOTE — DISCUSSION/SUMMARY
[FreeTextEntry1] : 1. On ASA/Plavix/Statin\par 2.  Continue walking therapy\par     30 - 45 minutes, 5 times per week\par 3 Will observe Left leg for any symptoms, conservative management for now. \par 4.  Come back in 3 months for followup visit.

## 2020-12-11 NOTE — REASON FOR VISIT
[Follow-Up - Clinic] : a clinic follow-up of [FreeTextEntry1] : Followup 12/11/2020\par  \par  Right leg feels much better\par Had R sfa intervention with Dr. Atkins\par on aspirin and plavix\par L groin feels ok\par now can walk 5-6 blocks\par Previously 2-3 blocks.

## 2020-12-11 NOTE — REVIEW OF SYSTEMS
[see HPI] : see HPI [Negative] : Heme/Lymph [Shortness Of Breath] : no shortness of breath [Chest Pain] : no chest pain [Leg Claudication] : no intermittent leg claudication

## 2020-12-11 NOTE — ASSESSMENT
[FreeTextEntry1] : 1. PAD on CT As Above\par 2. [place] Class 3 Claudication - resolved\par 3. HTN\par 4. CAD s/p PCI\par 5. HLD

## 2020-12-11 NOTE — PHYSICAL EXAM
[Normal Conjunctiva] : the conjunctiva exhibited no abnormalities [Auscultation Breath Sounds / Voice Sounds] : lungs were clear to auscultation bilaterally [Heart Rate And Rhythm] : heart rate and rhythm were normal [Heart Sounds] : normal S1 and S2 [Bowel Sounds] : normal bowel sounds [Abdomen Soft] : soft [Abdomen Tenderness] : non-tender [Abnormal Walk] : normal gait [Cyanosis, Localized] : no localized cyanosis [] : no rash [No Skin Ulcers] : no skin ulcer [Oriented To Time, Place, And Person] : oriented to person, place, and time [FreeTextEntry1] : R DP Palpable. R PT Doppler. L DP / PT Doppler.  L groin stable.

## 2020-12-16 ENCOUNTER — NON-APPOINTMENT (OUTPATIENT)
Age: 75
End: 2020-12-16

## 2020-12-23 PROBLEM — N39.0 BACTERIAL UTI: Status: RESOLVED | Noted: 2020-09-13 | Resolved: 2020-12-23

## 2021-01-23 ENCOUNTER — APPOINTMENT (OUTPATIENT)
Dept: INTERNAL MEDICINE | Facility: CLINIC | Age: 76
End: 2021-01-23

## 2021-01-30 ENCOUNTER — APPOINTMENT (OUTPATIENT)
Dept: DISASTER EMERGENCY | Facility: CLINIC | Age: 76
End: 2021-01-30

## 2021-02-16 ENCOUNTER — OUTPATIENT (OUTPATIENT)
Dept: OUTPATIENT SERVICES | Facility: HOSPITAL | Age: 76
LOS: 1 days | End: 2021-02-16

## 2021-02-16 VITALS
OXYGEN SATURATION: 97 % | HEART RATE: 69 BPM | HEIGHT: 67 IN | SYSTOLIC BLOOD PRESSURE: 160 MMHG | DIASTOLIC BLOOD PRESSURE: 72 MMHG | WEIGHT: 184.09 LBS | TEMPERATURE: 98 F | RESPIRATION RATE: 16 BRPM

## 2021-02-16 DIAGNOSIS — N40.1 BENIGN PROSTATIC HYPERPLASIA WITH LOWER URINARY TRACT SYMPTOMS: ICD-10-CM

## 2021-02-16 DIAGNOSIS — E11.9 TYPE 2 DIABETES MELLITUS WITHOUT COMPLICATIONS: ICD-10-CM

## 2021-02-16 DIAGNOSIS — Z87.438 PERSONAL HISTORY OF OTHER DISEASES OF MALE GENITAL ORGANS: ICD-10-CM

## 2021-02-16 DIAGNOSIS — I10 ESSENTIAL (PRIMARY) HYPERTENSION: ICD-10-CM

## 2021-02-16 DIAGNOSIS — Z98.890 OTHER SPECIFIED POSTPROCEDURAL STATES: Chronic | ICD-10-CM

## 2021-02-16 LAB
A1C WITH ESTIMATED AVERAGE GLUCOSE RESULT: 6.9 % — HIGH (ref 4–5.6)
ANION GAP SERPL CALC-SCNC: 10 MMOL/L — SIGNIFICANT CHANGE UP (ref 7–14)
BUN SERPL-MCNC: 18 MG/DL — SIGNIFICANT CHANGE UP (ref 7–23)
CALCIUM SERPL-MCNC: 9.7 MG/DL — SIGNIFICANT CHANGE UP (ref 8.4–10.5)
CHLORIDE SERPL-SCNC: 103 MMOL/L — SIGNIFICANT CHANGE UP (ref 98–107)
CO2 SERPL-SCNC: 28 MMOL/L — SIGNIFICANT CHANGE UP (ref 22–31)
CREAT SERPL-MCNC: 0.82 MG/DL — SIGNIFICANT CHANGE UP (ref 0.5–1.3)
ESTIMATED AVERAGE GLUCOSE: 151 MG/DL — HIGH (ref 68–114)
GLUCOSE SERPL-MCNC: 189 MG/DL — HIGH (ref 70–99)
HCT VFR BLD CALC: 47 % — SIGNIFICANT CHANGE UP (ref 39–50)
HGB BLD-MCNC: 15.1 G/DL — SIGNIFICANT CHANGE UP (ref 13–17)
MCHC RBC-ENTMCNC: 29.5 PG — SIGNIFICANT CHANGE UP (ref 27–34)
MCHC RBC-ENTMCNC: 32.1 GM/DL — SIGNIFICANT CHANGE UP (ref 32–36)
MCV RBC AUTO: 91.8 FL — SIGNIFICANT CHANGE UP (ref 80–100)
NRBC # BLD: 0 /100 WBCS — SIGNIFICANT CHANGE UP
NRBC # FLD: 0 K/UL — SIGNIFICANT CHANGE UP
PLATELET # BLD AUTO: 185 K/UL — SIGNIFICANT CHANGE UP (ref 150–400)
POTASSIUM SERPL-MCNC: 3.8 MMOL/L — SIGNIFICANT CHANGE UP (ref 3.5–5.3)
POTASSIUM SERPL-SCNC: 3.8 MMOL/L — SIGNIFICANT CHANGE UP (ref 3.5–5.3)
RBC # BLD: 5.12 M/UL — SIGNIFICANT CHANGE UP (ref 4.2–5.8)
RBC # FLD: 13.7 % — SIGNIFICANT CHANGE UP (ref 10.3–14.5)
SODIUM SERPL-SCNC: 141 MMOL/L — SIGNIFICANT CHANGE UP (ref 135–145)
WBC # BLD: 8.98 K/UL — SIGNIFICANT CHANGE UP (ref 3.8–10.5)
WBC # FLD AUTO: 8.98 K/UL — SIGNIFICANT CHANGE UP (ref 3.8–10.5)

## 2021-02-16 RX ORDER — SODIUM CHLORIDE 9 MG/ML
1000 INJECTION, SOLUTION INTRAVENOUS
Refills: 0 | Status: DISCONTINUED | OUTPATIENT
Start: 2021-02-24 | End: 2021-02-24

## 2021-02-16 RX ORDER — LOSARTAN/HYDROCHLOROTHIAZIDE 100MG-25MG
1 TABLET ORAL
Qty: 0 | Refills: 0 | DISCHARGE

## 2021-02-16 RX ORDER — METFORMIN HYDROCHLORIDE 850 MG/1
1 TABLET ORAL
Qty: 0 | Refills: 0 | DISCHARGE

## 2021-02-16 RX ORDER — AMLODIPINE BESYLATE 2.5 MG/1
1 TABLET ORAL
Qty: 0 | Refills: 0 | DISCHARGE

## 2021-02-16 RX ORDER — LOSARTAN POTASSIUM 100 MG/1
1 TABLET, FILM COATED ORAL
Qty: 0 | Refills: 0 | DISCHARGE

## 2021-02-16 NOTE — H&P PST ADULT - GENITOURINARY COMMENTS
Pt hx BPH with increased urinary frequency and retention - prior surgery 2012 - pt denies pain or hematuria

## 2021-02-16 NOTE — H&P PST ADULT - CARDIOVASCULAR COMMENTS
Recent angioplasty R SFA - 12/2/20 - on plavix /ASA Recent balloon angioplasty R SFA - 12/2/20 - on plavix /ASA - good postop course

## 2021-02-16 NOTE — H&P PST ADULT - NSICDXPASTSURGICALHX_GEN_ALL_CORE_FT
PAST SURGICAL HISTORY:  S/P coronary artery stent placement 2010     PAST SURGICAL HISTORY:  S/P coronary artery stent placement 2010    S/P primary angioplasty balloon right SFA  - 12/2/20

## 2021-02-16 NOTE — H&P PST ADULT - HISTORY OF PRESENT ILLNESS
66 year old male presents with 1 year history of constant urinary frequency and was diagnosed with benign prostatic hypertrophy  .Pt also states he has a bladder tumor.   Pt is scheduled for cystoscopy with transurethral resection of bladder tumor and transurethral resection of prostate on 03/2012.  Pt denies dysuria, hematuria, fever, flank pain and abdominal pain. Pt is a 75 yr old male scheduled for Cysto Biopolar Transurethral Resection of the Prostate with Dr Tee tentatively 2/24/21 - pt hx BPH and had prior resection 2012 - now c/o of increased frequency and need for surgery. Postponed due to COVID. Hx of recent balloon anioplasty right SFA 12/2/20 - pt on Plavix /ASA - pt hx CAD stents x5 2010   Pt is poor historian   Pt denies COVID or recent travel   Pt to have COVID preop test

## 2021-02-16 NOTE — H&P PST ADULT - NSANTHOSAYNRD_GEN_A_CORE
No. PREETHI screening performed.  STOP BANG Legend: 0-2 = LOW Risk; 3-4 = INTERMEDIATE Risk; 5-8 = HIGH Risk

## 2021-02-16 NOTE — H&P PST ADULT - NSICDXPROBLEM_GEN_ALL_CORE_FT
PROBLEM DIAGNOSES  Problem: History of BPH  Assessment and Plan: Pt scheduled for surgery and preop instructions including instructions for taking Famotidine on the day of surgery, given verbally and with use of  written materials, and patient confirming understanding of such instructions using  teach back method.  OR booking notified of CAD stents / Plavix /ASA and faizan precautions , DM   Pt to see PCP for [facility] and Cardio for CC - Vascular clearance and instructions for preop Plavix to stop 5 days preop and to stay on ASA  - pt informed       Problem: HTN (hypertension)  Assessment and Plan: Pt to take Losartan am DOS    Problem: DM (diabetes mellitus)  Assessment and Plan: Pt to take last dose of Metformin 2/23/21 am dose

## 2021-02-16 NOTE — H&P PST ADULT - NSICDXPASTMEDICALHX_GEN_ALL_CORE_FT
PAST MEDICAL HISTORY:  CAD (coronary artery disease) s/p  angiogram with stent placement 2010   @ [facility]    Claudication     GERD (gastroesophageal reflux disease)     Hyperlipidemia     Hypertension     PAD (peripheral artery disease)      PAST MEDICAL HISTORY:  CAD (coronary artery disease) s/p  angiogram with stent placement 2010   @ [facility]    Claudication     DM (diabetes mellitus)     GERD (gastroesophageal reflux disease)     History of BPH     History of smoking     Hyperlipidemia     Hypertension     PAD (peripheral artery disease)

## 2021-02-17 ENCOUNTER — NON-APPOINTMENT (OUTPATIENT)
Age: 76
End: 2021-02-17

## 2021-02-17 ENCOUNTER — APPOINTMENT (OUTPATIENT)
Dept: INTERNAL MEDICINE | Facility: CLINIC | Age: 76
End: 2021-02-17
Payer: MEDICARE

## 2021-02-17 VITALS
BODY MASS INDEX: 28.49 KG/M2 | WEIGHT: 188 LBS | DIASTOLIC BLOOD PRESSURE: 80 MMHG | TEMPERATURE: 98 F | HEART RATE: 66 BPM | SYSTOLIC BLOOD PRESSURE: 140 MMHG | HEIGHT: 68 IN | OXYGEN SATURATION: 97 %

## 2021-02-17 DIAGNOSIS — Z01.818 ENCOUNTER FOR OTHER PREPROCEDURAL EXAMINATION: ICD-10-CM

## 2021-02-17 PROCEDURE — 93000 ELECTROCARDIOGRAM COMPLETE: CPT

## 2021-02-17 PROCEDURE — 99214 OFFICE O/P EST MOD 30 MIN: CPT | Mod: 25

## 2021-02-20 DIAGNOSIS — Z01.818 ENCOUNTER FOR OTHER PREPROCEDURAL EXAMINATION: ICD-10-CM

## 2021-02-21 ENCOUNTER — APPOINTMENT (OUTPATIENT)
Dept: DISASTER EMERGENCY | Facility: CLINIC | Age: 76
End: 2021-02-21

## 2021-02-21 LAB — SARS-COV-2 N GENE NPH QL NAA+PROBE: NOT DETECTED

## 2021-02-23 ENCOUNTER — TRANSCRIPTION ENCOUNTER (OUTPATIENT)
Age: 76
End: 2021-02-23

## 2021-02-24 ENCOUNTER — APPOINTMENT (OUTPATIENT)
Dept: UROLOGY | Facility: HOSPITAL | Age: 76
End: 2021-02-24

## 2021-02-24 ENCOUNTER — INPATIENT (INPATIENT)
Facility: HOSPITAL | Age: 76
LOS: 0 days | Discharge: HOME | End: 2021-02-25
Attending: UROLOGY | Admitting: UROLOGY
Payer: MEDICARE

## 2021-02-24 ENCOUNTER — RESULT REVIEW (OUTPATIENT)
Age: 76
End: 2021-02-24

## 2021-02-24 VITALS
OXYGEN SATURATION: 96 % | HEART RATE: 62 BPM | RESPIRATION RATE: 18 BRPM | WEIGHT: 184.09 LBS | DIASTOLIC BLOOD PRESSURE: 61 MMHG | SYSTOLIC BLOOD PRESSURE: 135 MMHG | TEMPERATURE: 98 F | HEIGHT: 67 IN

## 2021-02-24 DIAGNOSIS — Z87.438 PERSONAL HISTORY OF OTHER DISEASES OF MALE GENITAL ORGANS: ICD-10-CM

## 2021-02-24 DIAGNOSIS — Z98.890 OTHER SPECIFIED POSTPROCEDURAL STATES: Chronic | ICD-10-CM

## 2021-02-24 DIAGNOSIS — N40.1 BENIGN PROSTATIC HYPERPLASIA WITH LOWER URINARY TRACT SYMPTOMS: ICD-10-CM

## 2021-02-24 LAB — GLUCOSE BLDC GLUCOMTR-MCNC: 322 MG/DL — HIGH (ref 70–99)

## 2021-02-24 PROCEDURE — 52601 PROSTATECTOMY (TURP): CPT

## 2021-02-24 PROCEDURE — 88305 TISSUE EXAM BY PATHOLOGIST: CPT | Mod: 26

## 2021-02-24 RX ORDER — HYDROMORPHONE HYDROCHLORIDE 2 MG/ML
0.5 INJECTION INTRAMUSCULAR; INTRAVENOUS; SUBCUTANEOUS
Refills: 0 | Status: DISCONTINUED | OUTPATIENT
Start: 2021-02-24 | End: 2021-02-24

## 2021-02-24 RX ORDER — ATORVASTATIN CALCIUM 80 MG/1
40 TABLET, FILM COATED ORAL AT BEDTIME
Refills: 0 | Status: DISCONTINUED | OUTPATIENT
Start: 2021-02-24 | End: 2021-02-25

## 2021-02-24 RX ORDER — DEXTROSE 50 % IN WATER 50 %
25 SYRINGE (ML) INTRAVENOUS ONCE
Refills: 0 | Status: DISCONTINUED | OUTPATIENT
Start: 2021-02-24 | End: 2021-02-25

## 2021-02-24 RX ORDER — INSULIN LISPRO 100/ML
VIAL (ML) SUBCUTANEOUS
Refills: 0 | Status: DISCONTINUED | OUTPATIENT
Start: 2021-02-24 | End: 2021-02-25

## 2021-02-24 RX ORDER — AMLODIPINE BESYLATE 2.5 MG/1
10 TABLET ORAL DAILY
Refills: 0 | Status: DISCONTINUED | OUTPATIENT
Start: 2021-02-24 | End: 2021-02-25

## 2021-02-24 RX ORDER — ATORVASTATIN CALCIUM 80 MG/1
1 TABLET, FILM COATED ORAL
Qty: 0 | Refills: 0 | DISCHARGE

## 2021-02-24 RX ORDER — SODIUM CHLORIDE 9 MG/ML
1000 INJECTION, SOLUTION INTRAVENOUS
Refills: 0 | Status: DISCONTINUED | OUTPATIENT
Start: 2021-02-24 | End: 2021-02-25

## 2021-02-24 RX ORDER — HYDROCHLOROTHIAZIDE 25 MG
25 TABLET ORAL DAILY
Refills: 0 | Status: DISCONTINUED | OUTPATIENT
Start: 2021-02-24 | End: 2021-02-25

## 2021-02-24 RX ORDER — OXYCODONE HYDROCHLORIDE 5 MG/1
5 TABLET ORAL ONCE
Refills: 0 | Status: DISCONTINUED | OUTPATIENT
Start: 2021-02-24 | End: 2021-02-24

## 2021-02-24 RX ORDER — INSULIN LISPRO 100/ML
VIAL (ML) SUBCUTANEOUS AT BEDTIME
Refills: 0 | Status: DISCONTINUED | OUTPATIENT
Start: 2021-02-24 | End: 2021-02-25

## 2021-02-24 RX ORDER — METFORMIN HYDROCHLORIDE 850 MG/1
1 TABLET ORAL
Qty: 0 | Refills: 0 | DISCHARGE

## 2021-02-24 RX ORDER — LOSARTAN/HYDROCHLOROTHIAZIDE 100MG-25MG
1 TABLET ORAL
Qty: 0 | Refills: 0 | DISCHARGE

## 2021-02-24 RX ORDER — ONDANSETRON 8 MG/1
4 TABLET, FILM COATED ORAL ONCE
Refills: 0 | Status: DISCONTINUED | OUTPATIENT
Start: 2021-02-24 | End: 2021-02-24

## 2021-02-24 RX ORDER — ASPIRIN/CALCIUM CARB/MAGNESIUM 324 MG
81 TABLET ORAL DAILY
Refills: 0 | Status: DISCONTINUED | OUTPATIENT
Start: 2021-02-24 | End: 2021-02-25

## 2021-02-24 RX ORDER — LOSARTAN POTASSIUM 100 MG/1
100 TABLET, FILM COATED ORAL DAILY
Refills: 0 | Status: DISCONTINUED | OUTPATIENT
Start: 2021-02-24 | End: 2021-02-25

## 2021-02-24 RX ORDER — HEPARIN SODIUM 5000 [USP'U]/ML
5000 INJECTION INTRAVENOUS; SUBCUTANEOUS EVERY 8 HOURS
Refills: 0 | Status: DISCONTINUED | OUTPATIENT
Start: 2021-02-24 | End: 2021-02-25

## 2021-02-24 RX ORDER — DEXTROSE 50 % IN WATER 50 %
15 SYRINGE (ML) INTRAVENOUS ONCE
Refills: 0 | Status: DISCONTINUED | OUTPATIENT
Start: 2021-02-24 | End: 2021-02-25

## 2021-02-24 RX ORDER — GLUCAGON INJECTION, SOLUTION 0.5 MG/.1ML
1 INJECTION, SOLUTION SUBCUTANEOUS ONCE
Refills: 0 | Status: DISCONTINUED | OUTPATIENT
Start: 2021-02-24 | End: 2021-02-25

## 2021-02-24 RX ORDER — DEXTROSE 50 % IN WATER 50 %
12.5 SYRINGE (ML) INTRAVENOUS ONCE
Refills: 0 | Status: DISCONTINUED | OUTPATIENT
Start: 2021-02-24 | End: 2021-02-25

## 2021-02-24 RX ORDER — METOPROLOL TARTRATE 50 MG
1 TABLET ORAL
Qty: 0 | Refills: 0 | DISCHARGE

## 2021-02-24 RX ORDER — ASPIRIN/CALCIUM CARB/MAGNESIUM 324 MG
1 TABLET ORAL
Qty: 0 | Refills: 0 | DISCHARGE

## 2021-02-24 RX ORDER — CIPROFLOXACIN LACTATE 400MG/40ML
400 VIAL (ML) INTRAVENOUS EVERY 12 HOURS
Refills: 0 | Status: DISCONTINUED | OUTPATIENT
Start: 2021-02-24 | End: 2021-02-25

## 2021-02-24 RX ORDER — AMLODIPINE BESYLATE 2.5 MG/1
1 TABLET ORAL
Qty: 0 | Refills: 0 | DISCHARGE

## 2021-02-24 RX ORDER — METOPROLOL TARTRATE 50 MG
25 TABLET ORAL DAILY
Refills: 0 | Status: DISCONTINUED | OUTPATIENT
Start: 2021-02-24 | End: 2021-02-25

## 2021-02-24 RX ADMIN — Medication 4: at 21:37

## 2021-02-24 RX ADMIN — Medication 200 MILLIGRAM(S): at 23:45

## 2021-02-24 RX ADMIN — ATORVASTATIN CALCIUM 40 MILLIGRAM(S): 80 TABLET, FILM COATED ORAL at 21:37

## 2021-02-24 RX ADMIN — HEPARIN SODIUM 5000 UNIT(S): 5000 INJECTION INTRAVENOUS; SUBCUTANEOUS at 21:37

## 2021-02-24 RX ADMIN — SODIUM CHLORIDE 75 MILLILITER(S): 9 INJECTION, SOLUTION INTRAVENOUS at 23:45

## 2021-02-24 RX ADMIN — SODIUM CHLORIDE 30 MILLILITER(S): 9 INJECTION, SOLUTION INTRAVENOUS at 13:59

## 2021-02-24 NOTE — PROGRESS NOTE ADULT - PROBLEM SELECTOR PLAN 1
Strict I&O's / CBI  Analgesia Antiemetics  DVT prophylaxis  Incentive spirometry  REG DIET / OOB  AM labs

## 2021-02-24 NOTE — ASU PATIENT PROFILE, ADULT - PMH
CAD (coronary artery disease)  s/p  angiogram with stent placement 2010   @ [facility]  Claudication    DM (diabetes mellitus)    GERD (gastroesophageal reflux disease)    History of BPH    History of smoking    Hyperlipidemia    Hypertension    PAD (peripheral artery disease)

## 2021-02-24 NOTE — BRIEF OPERATIVE NOTE - OPERATION/FINDINGS
electrosurgical resection of prostatic adenoma. b/l UO's identified away from resection site. hemostasis achieved

## 2021-02-25 ENCOUNTER — TRANSCRIPTION ENCOUNTER (OUTPATIENT)
Age: 76
End: 2021-02-25

## 2021-02-25 VITALS
OXYGEN SATURATION: 96 % | DIASTOLIC BLOOD PRESSURE: 61 MMHG | TEMPERATURE: 98 F | SYSTOLIC BLOOD PRESSURE: 143 MMHG | RESPIRATION RATE: 17 BRPM

## 2021-02-25 DIAGNOSIS — I73.9 PERIPHERAL VASCULAR DISEASE, UNSPECIFIED: ICD-10-CM

## 2021-02-25 DIAGNOSIS — E11.69 TYPE 2 DIABETES MELLITUS WITH OTHER SPECIFIED COMPLICATION: ICD-10-CM

## 2021-02-25 DIAGNOSIS — I25.10 ATHEROSCLEROTIC HEART DISEASE OF NATIVE CORONARY ARTERY WITHOUT ANGINA PECTORIS: ICD-10-CM

## 2021-02-25 DIAGNOSIS — Z71.6 TOBACCO ABUSE COUNSELING: ICD-10-CM

## 2021-02-25 DIAGNOSIS — K21.9 GASTRO-ESOPHAGEAL REFLUX DISEASE WITHOUT ESOPHAGITIS: ICD-10-CM

## 2021-02-25 DIAGNOSIS — N40.1 BENIGN PROSTATIC HYPERPLASIA WITH LOWER URINARY TRACT SYMPTOMS: ICD-10-CM

## 2021-02-25 DIAGNOSIS — Z29.9 ENCOUNTER FOR PROPHYLACTIC MEASURES, UNSPECIFIED: ICD-10-CM

## 2021-02-25 DIAGNOSIS — D35.00 BENIGN NEOPLASM OF UNSPECIFIED ADRENAL GLAND: ICD-10-CM

## 2021-02-25 DIAGNOSIS — E78.5 HYPERLIPIDEMIA, UNSPECIFIED: ICD-10-CM

## 2021-02-25 DIAGNOSIS — I10 ESSENTIAL (PRIMARY) HYPERTENSION: ICD-10-CM

## 2021-02-25 LAB
ANION GAP SERPL CALC-SCNC: 10 MMOL/L — SIGNIFICANT CHANGE UP (ref 7–14)
BASOPHILS # BLD AUTO: 0.02 K/UL — SIGNIFICANT CHANGE UP (ref 0–0.2)
BASOPHILS NFR BLD AUTO: 0.2 % — SIGNIFICANT CHANGE UP (ref 0–2)
BUN SERPL-MCNC: 19 MG/DL — SIGNIFICANT CHANGE UP (ref 7–23)
CALCIUM SERPL-MCNC: 9.1 MG/DL — SIGNIFICANT CHANGE UP (ref 8.4–10.5)
CHLORIDE SERPL-SCNC: 102 MMOL/L — SIGNIFICANT CHANGE UP (ref 98–107)
CO2 SERPL-SCNC: 24 MMOL/L — SIGNIFICANT CHANGE UP (ref 22–31)
CREAT SERPL-MCNC: 0.85 MG/DL — SIGNIFICANT CHANGE UP (ref 0.5–1.3)
EOSINOPHIL # BLD AUTO: 0 K/UL — SIGNIFICANT CHANGE UP (ref 0–0.5)
EOSINOPHIL NFR BLD AUTO: 0 % — SIGNIFICANT CHANGE UP (ref 0–6)
GLUCOSE BLDC GLUCOMTR-MCNC: 126 MG/DL — HIGH (ref 70–99)
GLUCOSE BLDC GLUCOMTR-MCNC: 149 MG/DL — HIGH (ref 70–99)
GLUCOSE BLDC GLUCOMTR-MCNC: 180 MG/DL — HIGH (ref 70–99)
GLUCOSE SERPL-MCNC: 235 MG/DL — HIGH (ref 70–99)
HCT VFR BLD CALC: 39.5 % — SIGNIFICANT CHANGE UP (ref 39–50)
HGB BLD-MCNC: 12.7 G/DL — LOW (ref 13–17)
IANC: 6.7 K/UL — SIGNIFICANT CHANGE UP (ref 1.5–8.5)
IMM GRANULOCYTES NFR BLD AUTO: 0.6 % — SIGNIFICANT CHANGE UP (ref 0–1.5)
LYMPHOCYTES # BLD AUTO: 0.91 K/UL — LOW (ref 1–3.3)
LYMPHOCYTES # BLD AUTO: 11.2 % — LOW (ref 13–44)
MCHC RBC-ENTMCNC: 29.8 PG — SIGNIFICANT CHANGE UP (ref 27–34)
MCHC RBC-ENTMCNC: 32.2 GM/DL — SIGNIFICANT CHANGE UP (ref 32–36)
MCV RBC AUTO: 92.7 FL — SIGNIFICANT CHANGE UP (ref 80–100)
MONOCYTES # BLD AUTO: 0.44 K/UL — SIGNIFICANT CHANGE UP (ref 0–0.9)
MONOCYTES NFR BLD AUTO: 5.4 % — SIGNIFICANT CHANGE UP (ref 2–14)
NEUTROPHILS # BLD AUTO: 6.7 K/UL — SIGNIFICANT CHANGE UP (ref 1.8–7.4)
NEUTROPHILS NFR BLD AUTO: 82.6 % — HIGH (ref 43–77)
NRBC # BLD: 0 /100 WBCS — SIGNIFICANT CHANGE UP
NRBC # FLD: 0 K/UL — SIGNIFICANT CHANGE UP
PLATELET # BLD AUTO: 184 K/UL — SIGNIFICANT CHANGE UP (ref 150–400)
POTASSIUM SERPL-MCNC: 4.4 MMOL/L — SIGNIFICANT CHANGE UP (ref 3.5–5.3)
POTASSIUM SERPL-SCNC: 4.4 MMOL/L — SIGNIFICANT CHANGE UP (ref 3.5–5.3)
RBC # BLD: 4.26 M/UL — SIGNIFICANT CHANGE UP (ref 4.2–5.8)
RBC # FLD: 14.1 % — SIGNIFICANT CHANGE UP (ref 10.3–14.5)
SODIUM SERPL-SCNC: 136 MMOL/L — SIGNIFICANT CHANGE UP (ref 135–145)
WBC # BLD: 8.12 K/UL — SIGNIFICANT CHANGE UP (ref 3.8–10.5)
WBC # FLD AUTO: 8.12 K/UL — SIGNIFICANT CHANGE UP (ref 3.8–10.5)

## 2021-02-25 PROCEDURE — 99223 1ST HOSP IP/OBS HIGH 75: CPT

## 2021-02-25 RX ORDER — CLOPIDOGREL BISULFATE 75 MG/1
1 TABLET, FILM COATED ORAL
Qty: 0 | Refills: 0 | DISCHARGE

## 2021-02-25 RX ORDER — INFLUENZA VIRUS VACCINE 15; 15; 15; 15 UG/.5ML; UG/.5ML; UG/.5ML; UG/.5ML
0.7 SUSPENSION INTRAMUSCULAR ONCE
Refills: 0 | Status: COMPLETED | OUTPATIENT
Start: 2021-02-25 | End: 2021-02-25

## 2021-02-25 RX ORDER — MOXIFLOXACIN HYDROCHLORIDE TABLETS, 400 MG 400 MG/1
1 TABLET, FILM COATED ORAL
Qty: 0 | Refills: 0 | DISCHARGE

## 2021-02-25 RX ORDER — INFLUENZA VIRUS VACCINE 15; 15; 15; 15 UG/.5ML; UG/.5ML; UG/.5ML; UG/.5ML
0.5 SUSPENSION INTRAMUSCULAR ONCE
Refills: 0 | Status: DISCONTINUED | OUTPATIENT
Start: 2021-02-25 | End: 2021-02-25

## 2021-02-25 RX ADMIN — Medication 81 MILLIGRAM(S): at 11:27

## 2021-02-25 RX ADMIN — Medication 200 MILLIGRAM(S): at 11:27

## 2021-02-25 RX ADMIN — HEPARIN SODIUM 5000 UNIT(S): 5000 INJECTION INTRAVENOUS; SUBCUTANEOUS at 05:12

## 2021-02-25 RX ADMIN — Medication 25 MILLIGRAM(S): at 05:13

## 2021-02-25 RX ADMIN — Medication 2: at 07:49

## 2021-02-25 NOTE — CONSULT NOTE ADULT - PROBLEM SELECTOR RECOMMENDATION 2
Asymptomatic. S/P stents X4, 2010, 2013. On DAPT. Plavix on hold as per Cardiology 1 week ago  -Cont ASA, Metoprolol and Statin  -F/U with Cardiology re: Plavix

## 2021-02-25 NOTE — DISCHARGE NOTE PROVIDER - CARE PROVIDERS DIRECT ADDRESSES
[email or web address]

## 2021-02-25 NOTE — CONSULT NOTE ADULT - PROBLEM SELECTOR RECOMMENDATION 4
well controlled on Metformin 500mg daily.  -FSSS with Admelog coverage  -Resume Metformin  at discharge

## 2021-02-25 NOTE — DISCHARGE NOTE PROVIDER - PROVIDER TOKENS
PROVIDER:[TOKEN:[[id number]]] PROVIDER:[TOKEN:[[id number]]],PROVIDER:[TOKEN:[[id number]]]

## 2021-02-25 NOTE — CONSULT NOTE ADULT - ASSESSMENT
75 y.o. M with h/o CAD, s/p Stents X4, PAD, s/p angioplasty to RSFA, DM2, HTN, adrenal nodule, HTN, BPH, admitted for TURP. S/P surgery, POD#1.

## 2021-02-25 NOTE — DISCHARGE NOTE PROVIDER - HOSPITAL COURSE
76 yo M underwent uncomplicated TURP on 2/24/2021.  CBI d/c POD #1, urine remained acceptable in color.  Pain well controlled, tolerating diet, ambulatory.  Successful TOV POD #1 with PVR zero, urine remained acceptable in color.  Pt d/c on POD #1 to f/u with Dr. Tee.  I-stop checked.

## 2021-02-25 NOTE — DISCHARGE NOTE NURSING/CASE MANAGEMENT/SOCIAL WORK - NSDCPNINST_GEN_ALL_CORE
Make a follow up appointment with Dr. Tee. Call him if you develop a fever, or if you have difficulty urinating or bloody urine. Drink plenty of liquids. Your A1C= 6.9. Continue to follow a consistent carbohydrate diet and take your medications for diabetes. Try to quit smoking

## 2021-02-25 NOTE — CONSULT NOTE ADULT - SUBJECTIVE AND OBJECTIVE BOX
Patient is a 75y old  Male who presents with a chief complaint of BPH (24 Feb 2021 22:22)      HPI: 75 y.o. M with h/o CAD, s/p Stents X4, PAD, s/p angioplasty to [facility], DM2, HTN, adrenal nodule, HTN, BPH, admitted for TURP. S/P surgery, POD#1. Pt has no complaints at this time, denies any pain, no CP or SOB. (+) flatus.       History limited due to: [ ] Dementia  [ ] Delirium  [ ] Condition    Pain Symptoms if applicable:             	                         none	     Pain:	                            0	      Location:	  Modifying factors:	  Associated symptoms:	    Function: [ x] Independent  [ ] Assistance  [ ] Total care  [ ] Non-ambulatory    Allergies    No Known Allergies    Intolerances        HOME MEDICATIONS: [ x] Reviewed    MEDICATIONS  (STANDING):  amLODIPine   Tablet 10 milliGRAM(s) Oral daily  aspirin enteric coated 81 milliGRAM(s) Oral daily  atorvastatin 40 milliGRAM(s) Oral at bedtime  ciprofloxacin   IVPB 400 milliGRAM(s) IV Intermittent every 12 hours  dextrose 40% Gel 15 Gram(s) Oral once  dextrose 5%. 1000 milliLiter(s) (50 mL/Hr) IV Continuous <Continuous>  dextrose 5%. 1000 milliLiter(s) (100 mL/Hr) IV Continuous <Continuous>  dextrose 50% Injectable 25 Gram(s) IV Push once  dextrose 50% Injectable 12.5 Gram(s) IV Push once  dextrose 50% Injectable 25 Gram(s) IV Push once  glucagon  Injectable 1 milliGRAM(s) IntraMuscular once  heparin   Injectable 5000 Unit(s) SubCutaneous every 8 hours  hydrochlorothiazide 25 milliGRAM(s) Oral daily  influenza  Vaccine (HIGH DOSE) 0.7 milliLiter(s) IntraMuscular once  insulin lispro (ADMELOG) corrective regimen sliding scale   SubCutaneous three times a day before meals  insulin lispro (ADMELOG) corrective regimen sliding scale   SubCutaneous at bedtime  lactated ringers. 1000 milliLiter(s) (75 mL/Hr) IV Continuous <Continuous>  losartan 100 milliGRAM(s) Oral daily  metoprolol succinate ER 25 milliGRAM(s) Oral daily    MEDICATIONS  (PRN):      PAST MEDICAL & SURGICAL HISTORY:  History of smoking    DM (diabetes mellitus)    History of BPH    Claudication    PAD (peripheral artery disease)    CAD (coronary artery disease)  s/p  angiogram with stent placement 2010, 2013   @ [facility]    GERD (gastroesophageal reflux disease)    Hyperlipidemia    Hypertension    S/P primary angioplasty  balloon right SFA  - 12/2/20    S/P coronary artery stent placement  2010, 2013    [x ] Reviewed     SOCIAL HISTORY:  Residence: [ ] [facility]  [ ] SNF  [x ] Community  [ ] Substance abuse: denies  [ ] Tobacco: ex-smoker, quit 6 months ago   [ ] Alcohol use: denies    FAMILY HISTORY:  [ ] No pertinent family history in first degree relatives     REVIEW OF SYSTEMS:    CONSTITUTIONAL: No fever, weight loss, or fatigue  EYES: No eye pain, visual disturbances, or discharge  ENMT:  No difficulty hearing, tinnitus, vertigo; No sinus or throat pain  NECK: No pain or stiffness  BREASTS: No pain, masses, or nipple discharge  RESPIRATORY: No cough, wheezing, chills or hemoptysis; No shortness of breath  CARDIOVASCULAR: No chest pain, palpitations, dizziness, or leg swelling  GASTROINTESTINAL: No abdominal or epigastric pain. No nausea, vomiting, or hematemesis; No diarrhea or constipation. No melena or hematochezia. (+) flatus post op   GENITOURINARY: No dysuria, frequency, hematuria, or incontinence, vidal d/c'ed   NEUROLOGICAL: No headaches, memory loss, loss of strength, numbness, or tremors  SKIN: No itching, burning, rashes, or lesions   LYMPH NODES: No enlarged glands  ENDOCRINE: No heat or cold intolerance; No hair loss  MUSCULOSKELETAL: No muscle or back pain  PSYCHIATRIC: No depression, anxiety, mood swings, or difficulty sleeping  HEME/LYMPH: No easy bruising, or bleeding gums  ALLERGY AND IMMUNOLOGIC: No hives or eczema    [  ] All other ROS negative  [  ] Unable to obtain due to poor mental status    Vital Signs Last 24 Hrs  T(C): 36.4 (25 Feb 2021 09:36), Max: 36.6 (24 Feb 2021 13:32)  T(F): 97.5 (25 Feb 2021 09:36), Max: 97.9 (24 Feb 2021 13:32)  HR: 56 (25 Feb 2021 05:07) (56 - 71)  BP: 143/61 (25 Feb 2021 09:36) (109/64 - 143/61)  BP(mean): 79 (24 Feb 2021 21:00) (77 - 94)  RR: 17 (25 Feb 2021 09:36) (13 - 20)  SpO2: 96% (25 Feb 2021 09:36) (92% - 100%)    PHYSICAL EXAM:    GENERAL: NAD, well-groomed, well-developed  HEAD:  Atraumatic, Normocephalic  EYES: EOMI, PERRLA, conjunctiva and sclera clear  ENMT: Moist mucous membranes  NECK: Supple, No JVD  RESPIRATORY: Clear to auscultation bilaterally; No rales, rhonchi, wheezing, or rubs  CARDIOVASCULAR: Regular rate and rhythm; (+) 2/6 sytolic murmurs at [place] , no rubs, or gallops  GASTROINTESTINAL: Soft, Nontender, Nondistended; Bowel sounds present  GENITOURINARY: Not examined  EXTREMITIES:  2+ Peripheral Pulses, No clubbing, cyanosis, or edema  NERVOUS SYSTEM:  Alert & Oriented X3; Moving all 4 extremities; No gross sensory deficits  HEME/LYMPH: No lymphadenopathy noted  SKIN: No rashes or lesions; Incisions C/D/I    LABS:                        12.7   8.12  )-----------( 184      ( 25 Feb 2021 06:51 )             39.5     02-25    136  |  102  |  19  ----------------------------<  235<H>  4.4   |  24  |  0.85    Ca    9.1      25 Feb 2021 06:51          CAPILLARY BLOOD GLUCOSE      POCT Blood Glucose.: 180 mg/dL (25 Feb 2021 07:32)      RADIOLOGY & ADDITIONAL STUDIES:    EKG:   Personally Reviewed:  [ x] YES Sinus malcom, no ST, TW changes    Imaging:   Personally Reviewed:  [ ] YES               Consultant(s) notes reviewed:  Cardiology clearance note  Care Discussed with Consultant(s)/Other Providers:    Advanced Directives: [ ] DNR  [ ] No feeding tube  [ ] MOLST in chart  [ ] MOLST completed today  [x ] Unknown

## 2021-02-25 NOTE — DISCHARGE NOTE PROVIDER - NSDCCPCAREPLAN_GEN_ALL_CORE_FT
PRINCIPAL DISCHARGE DIAGNOSIS  Diagnosis: Benign prostatic hyperplasia with lower urinary tract symptoms, symptom details unspecified  Assessment and Plan of Treatment: Keep well hydrated. No heavy lifting or straining for 4 to 6 weeks, avoid constipation. You may have intermittent pink tinged urine and urinary dribbling.  This is normal.   If your urine becomes bright red or with clots, please call the office.  Call Dr. Tee's office to schedule a follow up appointment.  Call the office if you have fever greater than 101, difficulty urinating, pain not relieved with pain medication, nausea/vomiting.        SECONDARY DISCHARGE DIAGNOSES  Diagnosis: Coronary artery disease involving native coronary artery of native heart without angina pectoris  Assessment and Plan of Treatment: DO NOT RESTART YOUR PLAVIX UNTIL WEDNESDAY 3/3    Diagnosis: Hyperlipidemia  Assessment and Plan of Treatment: Continue current home medications and follow up with your primary care provider      Diagnosis: Type 2 diabetes mellitus with other specified complication, without long-term current use of insulin  Assessment and Plan of Treatment: Continue current home medications and follow up with your primary care provider      Diagnosis: Essential hypertension  Assessment and Plan of Treatment: Continue current home medications and follow up with your primary care provider

## 2021-02-25 NOTE — DISCHARGE NOTE PROVIDER - NSDCMRMEDTOKEN_GEN_ALL_CORE_FT
Aspirin Enteric Coated 81 mg oral delayed release tablet: 1 tab(s) orally once a day  atorvastatin 40 mg oral tablet: 1 tab(s) orally once a day  Cipro 500 mg oral tablet: 1 tab(s) orally every 12 hours  losartan-hydrochlorothiazide 100 mg-25 mg oral tablet: 1 tab(s) orally once a day  metFORMIN 500 mg oral tablet: 1 tab(s) orally once a day  Norvasc 10 mg oral tablet: 1 tab(s) orally once a day  Plavix 75 mg oral tablet: 1 tab(s) orally once a day DO NOT RESTART UNTIL WEDNESDAY 3/3  Toprol-XL 25 mg oral tablet, extended release: 1 tab(s) orally once a day

## 2021-02-25 NOTE — DISCHARGE NOTE PROVIDER - CARE PROVIDER_API CALL
Vasiliy Tee)  Urology  [address] [place] [address], [place] [address]  Phone: [phone]  Fax: [phone]  Follow Up Time:    Vasiliy Tee)  Urology  [address], [address]  [place] [address]  Phone: [phone]  Fax: [phone]  Follow Up Time:     Conrad Chapman  CARDIOVASCULAR DISEASE  [address], [address]  [place] [address]  Phone: [phone]  Fax: [phone]  Follow Up Time:

## 2021-02-25 NOTE — CONSULT NOTE ADULT - PROBLEM SELECTOR RECOMMENDATION 9
S/P TURP, POD#1  -Management as per   -Pain control  -Bowel regimen  -Incentive spirometer  -OOB and ambulate  -DVT prophylaxis Pt has long h/o tobacco abuse, quit 6 months ago. Counseling for smoking cessation provided to pt. He is motivated to stay off tobacco.

## 2021-02-25 NOTE — DISCHARGE NOTE NURSING/CASE MANAGEMENT/SOCIAL WORK - PATIENT PORTAL LINK FT
You can access the FollowMyHealth Patient Portal offered by [facility] by registering at the following website: [email or web address]. By joining [facility]’s FollowMyHealth portal, you will also be able to view your health information using other applications (apps) compatible with our system.

## 2021-02-25 NOTE — PROGRESS NOTE ADULT - SUBJECTIVE AND OBJECTIVE BOX
Note    Post op Check    s/p : TURP    Pt seen / examined without complaints pain controlled    Vital Signs Last 24 Hrs  T(C): 36.5 (24 Feb 2021 21:00), Max: 36.6 (24 Feb 2021 13:32)  T(F): 97.7 (24 Feb 2021 21:00), Max: 97.9 (24 Feb 2021 13:32)  HR: 61 (24 Feb 2021 21:00) (58 - 71)  BP: 127/68 (24 Feb 2021 21:00) (109/64 - 135/61)  BP(mean): 79 (24 Feb 2021 21:00) (77 - 94)  RR: 20 (24 Feb 2021 21:00) (13 - 20)  SpO2: 100% (24 Feb 2021 21:00) (92% - 100%)    I&O's Summary    24 Feb 2021 07:01  -  24 Feb 2021 22:29  --------------------------------------------------------  IN: 315 mL / OUT: 0 mL / NET: 315 mL    EBL=25cc    PHYSICAL EXAM:       Constitutional: awake alert NAD    Respiratory: no resp distress    Cardiovascular: RRR    Gastrointestinal: soft NT ND    Genitourinary: vidal in place,  CBI in progress, clear color    Extremities: + venodynes                                      
Overnight events:  CBI remained clear and was clamped this AM    Subjective:  Pt offers no complaints    Objective:    Vital signs  T(C): , Max: 36.6 (02-24-21 @ 13:32)  HR: 56 (02-25-21 @ 05:07)  BP: 141/57 (02-25-21 @ 05:07)  SpO2: 96% (02-25-21 @ 05:07)  Wt(kg): --    Output   CBI clear, clamped earlier this AM  02-24 @ 07:01  -  02-25 @ 06:56  --------------------------------------------------------  IN: 945 mL / OUT: 0 mL / NET: 945 mL        Gen: NAD  Abd: soft, nontender  : marcy removed on rounds    Labs: pending

## 2021-02-25 NOTE — CONSULT NOTE ADULT - PROBLEM SELECTOR RECOMMENDATION 3
S/P Balloon angioplasty to R. SFA 12/2020.  Asymptomatic at this time  -F/U with vascular as an outpatient

## 2021-02-25 NOTE — PATIENT PROFILE ADULT - TOBACCO CESSATION EDUCATION/COUNSELLING(PROVIDED IF TOBACCO USED IN THE PAST 30 DAYS) NON CORE MEASURE SITES
[facility]  Nutrition Screen & Progress Note    Weight velocity:  Current weight (kg): 48.3    Weight percentile: 39  Weight last clinic visit: 47.1 kg (10/4/18)  Net change in weight: Up 1.2 kg  Daily weight gain goal (gm/day): <1-7 Actual gain (gm/day): 12 Points: 0    Height velocity:   Current height (cm): 164.7    Height percentile: 70   Height last clinic visit: 163.5 cm   Net change in height: up 1.2 cm    Annual height gain goal (cm/year): <1-2 Actual gain: 0.4 cm/mo Points: 0    BMI:  17.81 BMI percentile: 24 (stable)     Points: 1               Total points:1  (Low-risk 0-1 points, Moderate risk 2-3 points, High risk > 4 points)    BM: 1-2 per day, soft  PERT: [id number] (3 with meals, 1-2 with snacks)  Lipase units/kg/meal: 1242  CFTR modulator: Orkambi  Typical Diet:  3 meals and 2 -  3snacks  Vitamins: MVW softgels, vitamin D (4000 IU)  Calorie supplements: Boost VHC at breakfast    Visit details: Rylee is here with her mom.  She looks great.  On a break from soccer right now, mom feels she needs to exercise now instead of waiting until soccer starts back up again. Rylee has a good appetite and has no GI complaints.  Vitamin D was increased form 2000 IU per day to 4000 IU per day d/t last level was 19.  Informed mom that the next time she orders vitamins from [address] she can get the  softgels so she can take less vitamins.    Recommendations/Plan: continue with high calorie diet and resume physical activity.  Recheck vitamin D level in 6 months.   Follow-up: 3 months           Offered and patient declined

## 2021-02-25 NOTE — DISCHARGE NOTE NURSING/CASE MANAGEMENT/SOCIAL WORK - NSDPDISTO_GEN_ALL_CORE
Pt. is afebrile and offers no complaints. In no acute distress. Pt is ambulating with a walker, tolerating diet well, and voiding in adequate amounts./Home

## 2021-02-25 NOTE — PROGRESS NOTE ADULT - ASSESSMENT
74 yo M s/p TURP 2/24/2021 2/25: CBI remained clear and was clamped, urine remained clear, vidal removed    -f/u AM labs  -check PVR  -monitor color  -f/u medicine  -continue cipro  -continue to hold Plavix, restart as outpt  -DVT prophy, IS, OOB, ambulate  -possible d/c home later today  
75yM s/p TURP

## 2021-02-26 ENCOUNTER — NON-APPOINTMENT (OUTPATIENT)
Age: 76
End: 2021-02-26

## 2021-02-26 PROBLEM — Z87.438 PERSONAL HISTORY OF OTHER DISEASES OF MALE GENITAL ORGANS: Chronic | Status: ACTIVE | Noted: 2021-02-16

## 2021-02-26 PROBLEM — E11.9 TYPE 2 DIABETES MELLITUS WITHOUT COMPLICATIONS: Chronic | Status: ACTIVE | Noted: 2021-02-16

## 2021-02-26 PROBLEM — Z87.891 PERSONAL HISTORY OF NICOTINE DEPENDENCE: Chronic | Status: ACTIVE | Noted: 2021-02-16

## 2021-03-04 LAB — SURGICAL PATHOLOGY STUDY: SIGNIFICANT CHANGE UP

## 2021-03-08 ENCOUNTER — APPOINTMENT (OUTPATIENT)
Dept: INTERNAL MEDICINE | Facility: CLINIC | Age: 76
End: 2021-03-08
Payer: MEDICARE

## 2021-03-08 VITALS
WEIGHT: 188 LBS | BODY MASS INDEX: 28.49 KG/M2 | HEIGHT: 68 IN | TEMPERATURE: 97.5 F | SYSTOLIC BLOOD PRESSURE: 138 MMHG | DIASTOLIC BLOOD PRESSURE: 38 MMHG | HEART RATE: 80 BPM | OXYGEN SATURATION: 97 %

## 2021-03-08 DIAGNOSIS — Z09 ENCOUNTER FOR FOLLOW-UP EXAMINATION AFTER COMPLETED TREATMENT FOR CONDITIONS OTHER THAN MALIGNANT NEOPLASM: ICD-10-CM

## 2021-03-08 PROCEDURE — 36415 COLL VENOUS BLD VENIPUNCTURE: CPT

## 2021-03-08 PROCEDURE — 99495 TRANSJ CARE MGMT MOD F2F 14D: CPT | Mod: 25

## 2021-03-08 PROCEDURE — 99072 ADDL SUPL MATRL&STAF TM PHE: CPT

## 2021-03-09 ENCOUNTER — APPOINTMENT (OUTPATIENT)
Dept: CARDIOLOGY | Facility: CLINIC | Age: 76
End: 2021-03-09
Payer: MEDICARE

## 2021-03-09 VITALS
OXYGEN SATURATION: 96 % | SYSTOLIC BLOOD PRESSURE: 150 MMHG | HEART RATE: 63 BPM | BODY MASS INDEX: 28.49 KG/M2 | HEIGHT: 68 IN | DIASTOLIC BLOOD PRESSURE: 72 MMHG | WEIGHT: 188 LBS

## 2021-03-09 PROCEDURE — 99214 OFFICE O/P EST MOD 30 MIN: CPT

## 2021-03-09 PROCEDURE — 99072 ADDL SUPL MATRL&STAF TM PHE: CPT

## 2021-03-09 NOTE — PHYSICAL EXAM
[Normal Conjunctiva] : the conjunctiva exhibited no abnormalities [Auscultation Breath Sounds / Voice Sounds] : lungs were clear to auscultation bilaterally [Heart Rate And Rhythm] : heart rate and rhythm were normal [Heart Sounds] : normal S1 and S2 [Edema] : no peripheral edema present [Bowel Sounds] : normal bowel sounds [Abdomen Soft] : soft [Abdomen Tenderness] : non-tender [Abnormal Walk] : normal gait [Cyanosis, Localized] : no localized cyanosis [] : no rash [No Skin Ulcers] : no skin ulcer [Oriented To Time, Place, And Person] : oriented to person, place, and time [FreeTextEntry1] : R DP and PT palpable; diminished L DP and PT

## 2021-03-09 NOTE — ASU PATIENT PROFILE, ADULT - ATTEMPT TO OOB
George - can you schedule follow up?  Suri - can you request labs/imaging/endoscopy reports from [facility]? no

## 2021-03-09 NOTE — DISCUSSION/SUMMARY
[FreeTextEntry1] : 1. On ASA/Plavix/Statin\par 2. Continue walking therapy, 30 - 45 minutes x 5 times per week\par 3. Will observe Left leg for any symptoms, conservative management for now\par 4. systolic murmur and bruits noted, pt has an appointment with cardiologist (Dr. Chapman) likely radiating from heart, but Dr. Chapman to check carotid duplex)\par 4. Come back in 12 months for routine follow-up

## 2021-03-09 NOTE — REASON FOR VISIT
[Follow-Up - Clinic] : a clinic follow-up of [FreeTextEntry1] : Follow up 3/9/2021\par \par Pt is s/p R SFA balloon angioplasty on 12/2/20 by Dr. Atkins. He has been walking ~30 minutes everyday and denied any claudication. Compliant with medications and feeling well otherwise.\par on aspirin and plavix\par \par Seeing Dr. Bella tomorrow

## 2021-03-11 ENCOUNTER — NON-APPOINTMENT (OUTPATIENT)
Age: 76
End: 2021-03-11

## 2021-03-11 LAB
ALBUMIN SERPL ELPH-MCNC: 4.2 G/DL
ALP BLD-CCNC: 149 U/L
ALT SERPL-CCNC: 49 U/L
ANION GAP SERPL CALC-SCNC: 8 MMOL/L
AST SERPL-CCNC: 16 U/L
BILIRUB SERPL-MCNC: 0.7 MG/DL
BUN SERPL-MCNC: 18 MG/DL
CALCIUM SERPL-MCNC: 9.9 MG/DL
CHLORIDE SERPL-SCNC: 105 MMOL/L
CHOLEST SERPL-MCNC: 145 MG/DL
CO2 SERPL-SCNC: 30 MMOL/L
CREAT SERPL-MCNC: 0.83 MG/DL
ESTIMATED AVERAGE GLUCOSE: 157 MG/DL
GLUCOSE SERPL-MCNC: 231 MG/DL
HBA1C MFR BLD HPLC: 7.1 %
HDLC SERPL-MCNC: 30 MG/DL
LDLC SERPL CALC-MCNC: 71 MG/DL
LDLC SERPL DIRECT ASSAY-MCNC: 82 MG/DL
NONHDLC SERPL-MCNC: 115 MG/DL
POTASSIUM SERPL-SCNC: 4.8 MMOL/L
PROT SERPL-MCNC: 7 G/DL
SODIUM SERPL-SCNC: 142 MMOL/L
TRIGL SERPL-MCNC: 219 MG/DL

## 2021-03-18 ENCOUNTER — APPOINTMENT (OUTPATIENT)
Dept: UROLOGY | Facility: CLINIC | Age: 76
End: 2021-03-18
Payer: MEDICARE

## 2021-03-18 VITALS
TEMPERATURE: 97.8 F | WEIGHT: 182 LBS | HEART RATE: 73 BPM | HEIGHT: 68 IN | DIASTOLIC BLOOD PRESSURE: 62 MMHG | BODY MASS INDEX: 27.58 KG/M2 | SYSTOLIC BLOOD PRESSURE: 144 MMHG

## 2021-03-18 PROCEDURE — 99024 POSTOP FOLLOW-UP VISIT: CPT

## 2021-03-18 RX ORDER — CIPROFLOXACIN HYDROCHLORIDE 500 MG/1
500 TABLET, FILM COATED ORAL
Qty: 14 | Refills: 0 | Status: DISCONTINUED | COMMUNITY
Start: 2020-09-13 | End: 2021-03-18

## 2021-03-18 NOTE — ASSESSMENT
[FreeTextEntry1] : I counseled the patient that his observations with the continued frequency and mild stress incontinence at this point are somewhat expected after the operation and that with additional time these are expected to resolve.  I have also reviewed with him that urinary frequency after prostate resection can often take many months to be optimized with that we should see continued improvements over the next several months.  He is no longer in need of any prostate related medications.  I think his residual hematuria should be resolved over the next few weeks and it sounds to be very mild at this point which is as expected.  I will see him back next year.  He will require cystoscopy at that point given his prior history with bladder cancer.

## 2021-03-18 NOTE — HISTORY OF PRESENT ILLNESS
[FreeTextEntry1] : Johan Goins returns to the office today.  He is a 75-year-old man who is recently status post cystoscopy and transurethral resection of the prostate.  The procedure was performed to address recurrent bladder outlet obstruction related urinary symptoms.  He is back today in the office for follow-up.  He also has a history of bladder cancer.  At his recent cystoscopy in late February there was no evidence of any recurrent disease.\par \par Since surgery 3 weeks ago, he has noted mild and intermittent gross hematuria.  His urine today is clear.  He denies any dysuria.  He has noticed clear improvement in his urinary flow since the prostate resection but is still experiencing urinary frequency.  He denies urgency.  He is experiencing very mild urinary incontinence.

## 2021-03-23 ENCOUNTER — TRANSCRIPTION ENCOUNTER (OUTPATIENT)
Age: 76
End: 2021-03-23

## 2021-03-24 ENCOUNTER — TRANSCRIPTION ENCOUNTER (OUTPATIENT)
Age: 76
End: 2021-03-24

## 2021-05-28 ENCOUNTER — NON-APPOINTMENT (OUTPATIENT)
Age: 76
End: 2021-05-28

## 2021-06-04 ENCOUNTER — APPOINTMENT (OUTPATIENT)
Dept: INTERNAL MEDICINE | Facility: CLINIC | Age: 76
End: 2021-06-04
Payer: MEDICARE

## 2021-06-04 ENCOUNTER — NON-APPOINTMENT (OUTPATIENT)
Age: 76
End: 2021-06-04

## 2021-06-04 VITALS
HEART RATE: 65 BPM | WEIGHT: 188 LBS | OXYGEN SATURATION: 97 % | TEMPERATURE: 98.1 F | DIASTOLIC BLOOD PRESSURE: 60 MMHG | BODY MASS INDEX: 28.49 KG/M2 | HEIGHT: 68 IN | SYSTOLIC BLOOD PRESSURE: 138 MMHG

## 2021-06-04 DIAGNOSIS — Z00.00 ENCOUNTER FOR GENERAL ADULT MEDICAL EXAMINATION W/OUT ABNORMAL FINDINGS: ICD-10-CM

## 2021-06-04 LAB — SAVE SPECIMEN: NORMAL

## 2021-06-04 PROCEDURE — G0009: CPT

## 2021-06-04 PROCEDURE — 36415 COLL VENOUS BLD VENIPUNCTURE: CPT

## 2021-06-04 PROCEDURE — 90732 PPSV23 VACC 2 YRS+ SUBQ/IM: CPT

## 2021-06-04 PROCEDURE — 99397 PER PM REEVAL EST PAT 65+ YR: CPT | Mod: 25

## 2021-06-04 PROCEDURE — G0439: CPT

## 2021-06-04 PROCEDURE — 93000 ELECTROCARDIOGRAM COMPLETE: CPT

## 2021-06-04 PROCEDURE — 99072 ADDL SUPL MATRL&STAF TM PHE: CPT

## 2021-06-08 ENCOUNTER — NON-APPOINTMENT (OUTPATIENT)
Age: 76
End: 2021-06-08

## 2021-06-08 LAB
25(OH)D3 SERPL-MCNC: 50.4 NG/ML
ALBUMIN SERPL ELPH-MCNC: 4.2 G/DL
ALP BLD-CCNC: 114 U/L
ALT SERPL-CCNC: 63 U/L
ANION GAP SERPL CALC-SCNC: 11 MMOL/L
AST SERPL-CCNC: 25 U/L
BASOPHILS # BLD AUTO: 0.04 K/UL
BASOPHILS NFR BLD AUTO: 0.6 %
BILIRUB SERPL-MCNC: 0.3 MG/DL
BUN SERPL-MCNC: 18 MG/DL
CALCIUM SERPL-MCNC: 9.6 MG/DL
CHLORIDE SERPL-SCNC: 109 MMOL/L
CHOLEST SERPL-MCNC: 140 MG/DL
CO2 SERPL-SCNC: 26 MMOL/L
CREAT SERPL-MCNC: 0.93 MG/DL
CREAT SPEC-SCNC: 111 MG/DL
EOSINOPHIL # BLD AUTO: 0.13 K/UL
EOSINOPHIL NFR BLD AUTO: 1.9 %
ESTIMATED AVERAGE GLUCOSE: 154 MG/DL
GLUCOSE SERPL-MCNC: 154 MG/DL
HBA1C MFR BLD HPLC: 7 %
HCT VFR BLD CALC: 44.1 %
HDLC SERPL-MCNC: 38 MG/DL
HGB BLD-MCNC: 14 G/DL
IMM GRANULOCYTES NFR BLD AUTO: 0.6 %
LDLC SERPL CALC-MCNC: 83 MG/DL
LDLC SERPL DIRECT ASSAY-MCNC: 85 MG/DL
LYMPHOCYTES # BLD AUTO: 1.81 K/UL
LYMPHOCYTES NFR BLD AUTO: 26.2 %
MAN DIFF?: NORMAL
MCHC RBC-ENTMCNC: 29.6 PG
MCHC RBC-ENTMCNC: 31.7 GM/DL
MCV RBC AUTO: 93.2 FL
MICROALBUMIN 24H UR DL<=1MG/L-MCNC: 3.3 MG/DL
MICROALBUMIN/CREAT 24H UR-RTO: 30 MG/G
MONOCYTES # BLD AUTO: 0.54 K/UL
MONOCYTES NFR BLD AUTO: 7.8 %
NEUTROPHILS # BLD AUTO: 4.36 K/UL
NEUTROPHILS NFR BLD AUTO: 62.9 %
NONHDLC SERPL-MCNC: 102 MG/DL
PLATELET # BLD AUTO: 225 K/UL
POTASSIUM SERPL-SCNC: 4.5 MMOL/L
PROT SERPL-MCNC: 7.1 G/DL
PSA SERPL-MCNC: 0.75 NG/ML
RBC # BLD: 4.73 M/UL
RBC # FLD: 13.5 %
SODIUM SERPL-SCNC: 146 MMOL/L
T4 FREE SERPL-MCNC: 1.1 NG/DL
TRIGL SERPL-MCNC: 97 MG/DL
TSH SERPL-ACNC: 2.7 UIU/ML
WBC # FLD AUTO: 6.92 K/UL

## 2021-06-08 RX ORDER — METFORMIN HYDROCHLORIDE 500 MG/1
500 TABLET, COATED ORAL
Qty: 180 | Refills: 1 | Status: ACTIVE | COMMUNITY
Start: 1900-01-01 | End: 1900-01-01

## 2022-02-25 ENCOUNTER — OUTPATIENT (OUTPATIENT)
Dept: OUTPATIENT SERVICES | Facility: HOSPITAL | Age: 77
LOS: 1 days | End: 2022-02-25
Payer: MEDICARE

## 2022-02-25 ENCOUNTER — APPOINTMENT (OUTPATIENT)
Dept: UROLOGY | Facility: CLINIC | Age: 77
End: 2022-02-25
Payer: MEDICARE

## 2022-02-25 VITALS — SYSTOLIC BLOOD PRESSURE: 175 MMHG | HEART RATE: 68 BPM | DIASTOLIC BLOOD PRESSURE: 77 MMHG

## 2022-02-25 DIAGNOSIS — Z98.890 OTHER SPECIFIED POSTPROCEDURAL STATES: Chronic | ICD-10-CM

## 2022-02-25 DIAGNOSIS — R35.0 FREQUENCY OF MICTURITION: ICD-10-CM

## 2022-02-25 DIAGNOSIS — Z85.51 PERSONAL HISTORY OF MALIGNANT NEOPLASM OF BLADDER: ICD-10-CM

## 2022-02-25 DIAGNOSIS — N40.1 BENIGN PROSTATIC HYPERPLASIA WITH LOWER URINARY TRACT SYMPTOMS: ICD-10-CM

## 2022-02-25 DIAGNOSIS — F17.200 NICOTINE DEPENDENCE, UNSPECIFIED, UNCOMPLICATED: ICD-10-CM

## 2022-02-25 PROCEDURE — 99072 ADDL SUPL MATRL&STAF TM PHE: CPT

## 2022-02-25 PROCEDURE — 99214 OFFICE O/P EST MOD 30 MIN: CPT | Mod: 25

## 2022-02-25 PROCEDURE — 52000 CYSTOURETHROSCOPY: CPT

## 2022-02-25 PROCEDURE — 99406 BEHAV CHNG SMOKING 3-10 MIN: CPT

## 2022-02-25 PROCEDURE — 88112 CYTOPATH CELL ENHANCE TECH: CPT | Mod: 26

## 2022-03-02 LAB — URINE CYTOLOGY: NORMAL

## 2022-03-18 ENCOUNTER — APPOINTMENT (OUTPATIENT)
Dept: CARDIOLOGY | Facility: CLINIC | Age: 77
End: 2022-03-18
Payer: MEDICARE

## 2022-03-18 VITALS — SYSTOLIC BLOOD PRESSURE: 153 MMHG | HEART RATE: 69 BPM | DIASTOLIC BLOOD PRESSURE: 71 MMHG | OXYGEN SATURATION: 97 %

## 2022-03-18 DIAGNOSIS — I70.213 ATHEROSCLEROSIS OF NATIVE ARTERIES OF EXTREMITIES WITH INTERMITTENT CLAUDICATION, BILATERAL LEGS: ICD-10-CM

## 2022-03-18 PROCEDURE — 99072 ADDL SUPL MATRL&STAF TM PHE: CPT

## 2022-03-18 PROCEDURE — 99214 OFFICE O/P EST MOD 30 MIN: CPT

## 2022-03-18 NOTE — DISCUSSION/SUMMARY
[FreeTextEntry1] : 1. On ASA/Plavix/Statin\par 2. Continue walking therapy, 30 - 45 minutes x 5 times per week\par 3. Will observe Left leg for any symptoms, conservative management for now\par 4. systolic murmur and bruits noted - will obtain carotid duplex\par 5.  Stop smoking!\par 6.   Come back in 12 months for routine follow-up

## 2022-03-18 NOTE — REASON FOR VISIT
[Follow-Up - Clinic] : a clinic follow-up of [FreeTextEntry2] : PAD [FreeTextEntry1] : 3/18/2022\par Doing well\par Walking 5 blocks. \par He does have mild claudication on the left. \par no sig claudication on the right.  \par \par Pt is s/p R SFA balloon angioplasty on 12/2/20 by Dr. Atkins.  \par He is on aspirin and plavix\par  Not walking much during the winter\par Able to all his house chores

## 2022-03-25 ENCOUNTER — RESULT REVIEW (OUTPATIENT)
Age: 77
End: 2022-03-25

## 2022-03-25 ENCOUNTER — APPOINTMENT (OUTPATIENT)
Dept: ULTRASOUND IMAGING | Facility: CLINIC | Age: 77
End: 2022-03-25
Payer: MEDICARE

## 2022-03-25 ENCOUNTER — OUTPATIENT (OUTPATIENT)
Dept: OUTPATIENT SERVICES | Facility: HOSPITAL | Age: 77
LOS: 1 days | End: 2022-03-25
Payer: MEDICARE

## 2022-03-25 DIAGNOSIS — Z98.890 OTHER SPECIFIED POSTPROCEDURAL STATES: Chronic | ICD-10-CM

## 2022-03-25 DIAGNOSIS — I73.9 PERIPHERAL VASCULAR DISEASE, UNSPECIFIED: ICD-10-CM

## 2022-03-25 PROCEDURE — 93925 LOWER EXTREMITY STUDY: CPT

## 2022-03-25 PROCEDURE — 93925 LOWER EXTREMITY STUDY: CPT | Mod: 26

## 2022-03-25 PROCEDURE — 93880 EXTRACRANIAL BILAT STUDY: CPT | Mod: 26

## 2022-03-25 PROCEDURE — 93880 EXTRACRANIAL BILAT STUDY: CPT

## 2022-03-29 ENCOUNTER — NON-APPOINTMENT (OUTPATIENT)
Age: 77
End: 2022-03-29

## 2022-03-29 RX ORDER — AMLODIPINE BESYLATE 10 MG/1
10 TABLET ORAL DAILY
Qty: 90 | Refills: 1 | Status: ACTIVE | COMMUNITY
Start: 2020-06-19 | End: 1900-01-01

## 2022-09-14 ENCOUNTER — APPOINTMENT (OUTPATIENT)
Dept: INTERNAL MEDICINE | Facility: CLINIC | Age: 77
End: 2022-09-14

## 2022-09-14 ENCOUNTER — LABORATORY RESULT (OUTPATIENT)
Age: 77
End: 2022-09-14

## 2022-09-14 VITALS
SYSTOLIC BLOOD PRESSURE: 124 MMHG | TEMPERATURE: 97.8 F | WEIGHT: 180 LBS | HEIGHT: 68 IN | BODY MASS INDEX: 27.28 KG/M2 | DIASTOLIC BLOOD PRESSURE: 70 MMHG

## 2022-09-14 DIAGNOSIS — Z12.11 ENCOUNTER FOR SCREENING FOR MALIGNANT NEOPLASM OF COLON: ICD-10-CM

## 2022-09-14 DIAGNOSIS — L98.9 DISORDER OF THE SKIN AND SUBCUTANEOUS TISSUE, UNSPECIFIED: ICD-10-CM

## 2022-09-14 DIAGNOSIS — E55.9 VITAMIN D DEFICIENCY, UNSPECIFIED: ICD-10-CM

## 2022-09-14 DIAGNOSIS — E03.9 HYPOTHYROIDISM, UNSPECIFIED: ICD-10-CM

## 2022-09-14 DIAGNOSIS — R25.2 CRAMP AND SPASM: ICD-10-CM

## 2022-09-14 PROCEDURE — G0439: CPT

## 2022-09-14 PROCEDURE — 36415 COLL VENOUS BLD VENIPUNCTURE: CPT

## 2022-09-14 PROCEDURE — 99213 OFFICE O/P EST LOW 20 MIN: CPT | Mod: 25

## 2022-09-14 PROCEDURE — 99072 ADDL SUPL MATRL&STAF TM PHE: CPT

## 2022-09-15 PROBLEM — R25.2 LEG CRAMPS: Status: ACTIVE | Noted: 2018-02-01

## 2022-09-15 NOTE — HEALTH RISK ASSESSMENT
[Good] : ~his/her~  mood as  good [Former] : Former [No falls in past year] : Patient reported no falls in the past year [No] : In the past 12 months have you used drugs other than those required for medical reasons? No [0] : 2) Feeling down, depressed, or hopeless: Not at all (0) [Patient reported colonoscopy was normal] : Patient reported colonoscopy was normal [Retired] : retired [] :  [# Of Children ___] : has [unfilled] children [Smoke Detector] : smoke detector [Carbon Monoxide Detector] : carbon monoxide detector [Seat Belt] :  uses seat belt [Sunscreen] : uses sunscreen [de-identified] : walking [de-identified] : regular [[id number]] : 0 [Change in mental status noted] : No change in mental status noted [Reports changes in hearing] : Reports no changes in hearing [Reports changes in vision] : Reports no changes in vision [Reports changes in dental health] : Reports no changes in dental health [TB Exposure] : is not being exposed to tuberculosis [ColonoscopyDate] : 2016

## 2022-09-15 NOTE — HISTORY OF PRESENT ILLNESS
[FreeTextEntry1] : presents for CPE [de-identified] : Pt reports\par quit smoking 1 month ago\par leg cramps\par under urologist care- last visit 6 months ago\par

## 2022-09-29 LAB
25(OH)D3 SERPL-MCNC: 41.7 NG/ML
ALBUMIN SERPL ELPH-MCNC: 4.3 G/DL
ALP BLD-CCNC: 77 U/L
ALT SERPL-CCNC: 23 U/L
ANION GAP SERPL CALC-SCNC: 9 MMOL/L
APPEARANCE: CLEAR
AST SERPL-CCNC: 18 U/L
BASOPHILS # BLD AUTO: 0.05 K/UL
BASOPHILS NFR BLD AUTO: 0.6 %
BILIRUB SERPL-MCNC: 0.4 MG/DL
BILIRUBIN URINE: NEGATIVE
BLOOD URINE: NEGATIVE
BUN SERPL-MCNC: 24 MG/DL
CALCIUM SERPL-MCNC: 9.8 MG/DL
CHLORIDE SERPL-SCNC: 105 MMOL/L
CHOLEST SERPL-MCNC: 130 MG/DL
CO2 SERPL-SCNC: 28 MMOL/L
COLOR: YELLOW
CREAT SERPL-MCNC: 0.89 MG/DL
EGFR: 88 ML/MIN/1.73M2
EOSINOPHIL # BLD AUTO: 0.09 K/UL
EOSINOPHIL NFR BLD AUTO: 1.1 %
ESTIMATED AVERAGE GLUCOSE: 151 MG/DL
GLUCOSE QUALITATIVE U: NEGATIVE
GLUCOSE SERPL-MCNC: 140 MG/DL
HBA1C MFR BLD HPLC: 6.9 %
HCT VFR BLD CALC: 47.5 %
HDLC SERPL-MCNC: 33 MG/DL
HGB BLD-MCNC: 15.3 G/DL
IMM GRANULOCYTES NFR BLD AUTO: 0.4 %
KETONES URINE: NEGATIVE
LDLC SERPL CALC-MCNC: 69 MG/DL
LEUKOCYTE ESTERASE URINE: ABNORMAL
LYMPHOCYTES # BLD AUTO: 1.87 K/UL
LYMPHOCYTES NFR BLD AUTO: 22.4 %
MAN DIFF?: NORMAL
MCHC RBC-ENTMCNC: 28.9 PG
MCHC RBC-ENTMCNC: 32.2 GM/DL
MCV RBC AUTO: 89.6 FL
MONOCYTES # BLD AUTO: 0.62 K/UL
MONOCYTES NFR BLD AUTO: 7.4 %
NEUTROPHILS # BLD AUTO: 5.69 K/UL
NEUTROPHILS NFR BLD AUTO: 68.1 %
NITRITE URINE: NEGATIVE
NONHDLC SERPL-MCNC: 96 MG/DL
PH URINE: 6.5
PLATELET # BLD AUTO: 212 K/UL
POTASSIUM SERPL-SCNC: 4.8 MMOL/L
PROT SERPL-MCNC: 7.3 G/DL
PROTEIN URINE: NORMAL
RBC # BLD: 5.3 M/UL
RBC # FLD: 14.9 %
SODIUM SERPL-SCNC: 142 MMOL/L
SPECIFIC GRAVITY URINE: 1.02
TRIGL SERPL-MCNC: 136 MG/DL
TSH SERPL-ACNC: 3.98 UIU/ML
UROBILINOGEN URINE: NORMAL
WBC # FLD AUTO: 8.35 K/UL

## 2022-12-23 ENCOUNTER — APPOINTMENT (OUTPATIENT)
Dept: PAIN MANAGEMENT | Facility: CLINIC | Age: 77
End: 2022-12-23

## 2022-12-23 ENCOUNTER — APPOINTMENT (OUTPATIENT)
Dept: INTERNAL MEDICINE | Facility: CLINIC | Age: 77
End: 2022-12-23

## 2022-12-23 VITALS — BODY MASS INDEX: 26.83 KG/M2 | WEIGHT: 177 LBS | HEIGHT: 68 IN

## 2022-12-23 VITALS
HEART RATE: 62 BPM | DIASTOLIC BLOOD PRESSURE: 64 MMHG | SYSTOLIC BLOOD PRESSURE: 144 MMHG | HEIGHT: 68 IN | TEMPERATURE: 96.5 F | WEIGHT: 180 LBS | OXYGEN SATURATION: 95 % | BODY MASS INDEX: 27.28 KG/M2

## 2022-12-23 DIAGNOSIS — M54.32 SCIATICA, LEFT SIDE: ICD-10-CM

## 2022-12-23 PROCEDURE — 36415 COLL VENOUS BLD VENIPUNCTURE: CPT

## 2022-12-23 PROCEDURE — 99213 OFFICE O/P EST LOW 20 MIN: CPT | Mod: 25

## 2022-12-23 PROCEDURE — 99203 OFFICE O/P NEW LOW 30 MIN: CPT

## 2022-12-23 PROCEDURE — 99072 ADDL SUPL MATRL&STAF TM PHE: CPT

## 2022-12-23 RX ORDER — NICOTINE 21 MG/24HR
14 PATCH, TRANSDERMAL 24 HOURS TRANSDERMAL DAILY
Qty: 30 | Refills: 3 | Status: ACTIVE | COMMUNITY
Start: 2022-09-14 | End: 1900-01-01

## 2022-12-23 RX ORDER — METHYLPREDNISOLONE 4 MG/1
4 TABLET ORAL
Qty: 1 | Refills: 0 | Status: ACTIVE | COMMUNITY
Start: 2022-12-23 | End: 1900-01-01

## 2022-12-23 NOTE — HISTORY OF PRESENT ILLNESS
[FreeTextEntry1] : f//u with DM [de-identified] : Pt reports left side sciatica since few days. Just saw pain management. \par Denies numbness or tingling sensation.\par No fever,chills.

## 2022-12-23 NOTE — HISTORY OF PRESENT ILLNESS
[Lower back] : lower back [Sharp] : sharp [Shooting] : shooting [] : yes [Constant] : constant [Sleep] : sleep [Walking/activity] : walking/activity [Lying in bed] : lying in bed [FreeTextEntry1] : Initial HPI 12/23/2022:\par Pain started 3 weeks ago and is on the LEFT side of the lower back and radiates into the left buttock and down the left thigh and lower leg to the ankle described as a sharp shooting pain with associated electric shocks. \par \par MRI Lumbar Spine: none\par Conservative Care: HEP\par Pain Medications: Motrin PRN \par Past Injections: none\par Spine surgery: none \par Blood thinners: *pt takes 81mg Aspirin and PLAVIX (Dr. Mukherjee) \par

## 2022-12-23 NOTE — PHYSICAL EXAM
[No Acute Distress] : no acute distress [Well Nourished] : well nourished [No Respiratory Distress] : no respiratory distress  [No Accessory Muscle Use] : no accessory muscle use [Clear to Auscultation] : lungs were clear to auscultation bilaterally [Normal Rate] : normal rate  [Regular Rhythm] : with a regular rhythm [Normal S1, S2] : normal S1 and S2 [Pedal Pulses Present] : the pedal pulses are present [No Edema] : there was no peripheral edema [No Joint Swelling] : no joint swelling [Alert and Oriented x3] : oriented to person, place, and time [Normal Mood] : the mood was normal [de-identified] : left sciatica

## 2022-12-23 NOTE — DISCUSSION/SUMMARY
[de-identified] : After discussing various treatment options with the patient including but not limited to oral medications, physical therapy, exercise, as well as interventional spinal injections, we have decided with the following plan:\par \par - Continue home exercises, stretching, activity modification, physical therapy, and conservative care.\par - Will order lumbar MRI to rule out HNP. Please let this note serve as a formal request for authorization to perform a MRI of their Lumbar Spine.\par - Follow-up post diagnostic imaging to review and discuss further treatment.\par - Will prescribe Medrol Dose Pack to help relieve pain/inflammation.  Side effects including nausea, vomiting, headache, dizziness, trouble sleeping, swelling, irregular periods, increased blood sugar and appetite changes were discussed.\par

## 2022-12-23 NOTE — PHYSICAL EXAM
[de-identified] : Constitutional; Appears well, no apparent distress\par Ability to communicate: Normal \par Respiratory: non-labored breathing\par Skin: No rash noted\par Head: Normocephalic, atraumatic\par Neck: no visible thyroid enlargement\par Eyes: Extraocular movements intact\par Neurologic: Alert and oriented x3\par Psychiatric: normal mood, affect and behavior \par \par  [Flexion] : flexion [] : non-antalgic

## 2022-12-29 ENCOUNTER — FORM ENCOUNTER (OUTPATIENT)
Age: 77
End: 2022-12-29

## 2022-12-30 ENCOUNTER — APPOINTMENT (OUTPATIENT)
Dept: MRI IMAGING | Facility: CLINIC | Age: 77
End: 2022-12-30
Payer: MEDICARE

## 2022-12-30 PROCEDURE — 72148 MRI LUMBAR SPINE W/O DYE: CPT

## 2022-12-30 PROCEDURE — 99072 ADDL SUPL MATRL&STAF TM PHE: CPT

## 2023-01-03 LAB
ALBUMIN SERPL ELPH-MCNC: 4.4 G/DL
ALP BLD-CCNC: 63 U/L
ALT SERPL-CCNC: 11 U/L
ANION GAP SERPL CALC-SCNC: 12 MMOL/L
AST SERPL-CCNC: 12 U/L
BASOPHILS # BLD AUTO: 0.03 K/UL
BASOPHILS NFR BLD AUTO: 0.4 %
BILIRUB SERPL-MCNC: 0.4 MG/DL
BUN SERPL-MCNC: 16 MG/DL
CALCIUM SERPL-MCNC: 9.9 MG/DL
CHLORIDE SERPL-SCNC: 102 MMOL/L
CO2 SERPL-SCNC: 29 MMOL/L
CREAT SERPL-MCNC: 0.82 MG/DL
EGFR: 90 ML/MIN/1.73M2
EOSINOPHIL # BLD AUTO: 0.1 K/UL
EOSINOPHIL NFR BLD AUTO: 1.3 %
ESTIMATED AVERAGE GLUCOSE: 146 MG/DL
GLUCOSE SERPL-MCNC: 129 MG/DL
HBA1C MFR BLD HPLC: 6.7 %
HCT VFR BLD CALC: 45.6 %
HGB BLD-MCNC: 14.8 G/DL
IMM GRANULOCYTES NFR BLD AUTO: 0.5 %
LYMPHOCYTES # BLD AUTO: 2.19 K/UL
LYMPHOCYTES NFR BLD AUTO: 29.2 %
MAN DIFF?: NORMAL
MCHC RBC-ENTMCNC: 30.4 PG
MCHC RBC-ENTMCNC: 32.5 GM/DL
MCV RBC AUTO: 93.6 FL
MONOCYTES # BLD AUTO: 0.5 K/UL
MONOCYTES NFR BLD AUTO: 6.7 %
NEUTROPHILS # BLD AUTO: 4.65 K/UL
NEUTROPHILS NFR BLD AUTO: 61.9 %
PLATELET # BLD AUTO: 236 K/UL
POTASSIUM SERPL-SCNC: 4.3 MMOL/L
PROT SERPL-MCNC: 7.1 G/DL
RBC # BLD: 4.87 M/UL
RBC # FLD: 14.3 %
SODIUM SERPL-SCNC: 143 MMOL/L
WBC # FLD AUTO: 7.51 K/UL

## 2023-01-06 ENCOUNTER — APPOINTMENT (OUTPATIENT)
Dept: PAIN MANAGEMENT | Facility: CLINIC | Age: 78
End: 2023-01-06
Payer: MEDICARE

## 2023-01-06 VITALS — BODY MASS INDEX: 27.28 KG/M2 | WEIGHT: 180 LBS | HEIGHT: 68 IN

## 2023-01-06 DIAGNOSIS — M54.50 LOW BACK PAIN, UNSPECIFIED: ICD-10-CM

## 2023-01-06 DIAGNOSIS — M79.10 MYALGIA, UNSPECIFIED SITE: ICD-10-CM

## 2023-01-06 DIAGNOSIS — M54.17 RADICULOPATHY, LUMBOSACRAL REGION: ICD-10-CM

## 2023-01-06 PROCEDURE — 99072 ADDL SUPL MATRL&STAF TM PHE: CPT

## 2023-01-06 PROCEDURE — 99214 OFFICE O/P EST MOD 30 MIN: CPT

## 2023-01-06 NOTE — HISTORY OF PRESENT ILLNESS
[Lower back] : lower back [Sharp] : sharp [Shooting] : shooting [Constant] : constant [Sleep] : sleep [Walking/activity] : walking/activity [Lying in bed] : lying in bed [6] : 6 [5] : 5 [Dull/Aching] : dull/aching [Radiating] : radiating [Meds] : meds [] : yes [FreeTextEntry1] : 01/06/2023: pt is here to go over mri results of L-spine. Completed MDP which helped a lot.  Sill has pain radiating down the leg but not as severe. 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 \par \par MRI Lumbar Spine: none\par Conservative Care: HEP\par Pain Medications: Motrin PRN \par Past Injections: none\par Spine surgery: none \par Blood thinners: *pt takes 81mg Aspirin and PLAVIX (Dr. Mukherjee) \par  [FreeTextEntry7] : left leg

## 2023-01-06 NOTE — PHYSICAL EXAM
[Flexion] : flexion [de-identified] : Constitutional; Appears well, no apparent distress\par Ability to communicate: Normal \par Respiratory: non-labored breathing\par Skin: No rash noted\par Head: Normocephalic, atraumatic\par Neck: no visible thyroid enlargement\par Eyes: Extraocular movements intact\par Neurologic: Alert and oriented x3\par Psychiatric: normal mood, affect and behavior \par \par  [] : non-antalgic

## 2023-01-06 NOTE — DISCUSSION/SUMMARY
[de-identified] : After discussing various treatment options with the patient including but not limited to oral medications, physical therapy, exercise modalities as well as interventional spinal injections, we have decided with the following plan:\par \par - Continue Home exercises, stretching, activity modification, physical therapy, and conservative care.\par - MRI report and/or images was reviewed and discussed with the patient.\par - Recommend Left L4-5, L5-S1 Transforaminal Epidural Steroid Injection under fluoroscopic guidance with image.\par - The risks, benefits and alternatives of the proposed procedure were explained in detail with the patient. The risks outlined include but are not limited to infection, bleeding, post-dural puncture headache, nerve injury, a temporary increase in pain, failure to resolve symptoms, allergic reaction, symptom recurrence, and possible elevation of blood sugar in diabetics. All questions were answered to patient's apparent satisfaction and he/she verbalized an understanding.\par - Patient is presenting with acute/sub-acute radicular pain with impairment in ADLs and functionality.  The pain has not responded to conservative care including NSAID therapy and/or physical therapy.  There is no bleeding tendency, unstable medical condition, or systemic infection.\par - Follow up in 1-2 weeks post injection for re-evaluation.\par - Patient is on anticoagulation therapy and needs medical clearance to stop medication for the procedure (PLAVIX and ASA)

## 2023-02-01 ENCOUNTER — APPOINTMENT (OUTPATIENT)
Age: 78
End: 2023-02-01

## 2023-02-17 ENCOUNTER — APPOINTMENT (OUTPATIENT)
Dept: PAIN MANAGEMENT | Facility: CLINIC | Age: 78
End: 2023-02-17

## 2023-02-24 ENCOUNTER — APPOINTMENT (OUTPATIENT)
Dept: UROLOGY | Facility: CLINIC | Age: 78
End: 2023-02-24
Payer: MEDICARE

## 2023-02-24 DIAGNOSIS — F17.200 NICOTINE DEPENDENCE, UNSPECIFIED, UNCOMPLICATED: ICD-10-CM

## 2023-02-24 PROCEDURE — 99406 BEHAV CHNG SMOKING 3-10 MIN: CPT

## 2023-02-24 PROCEDURE — 88112 CYTOPATH CELL ENHANCE TECH: CPT | Mod: 26

## 2023-02-24 PROCEDURE — 99213 OFFICE O/P EST LOW 20 MIN: CPT

## 2023-02-24 PROCEDURE — 99072 ADDL SUPL MATRL&STAF TM PHE: CPT

## 2023-02-24 NOTE — ASSESSMENT
[FreeTextEntry1] : He is doing great with regard to lower urinary tract symptoms and has no residual need for any medication to address them.  The stress incontinence may be treated with pelvic floor physical therapy but he has no desire to do so at this time.  It is only very mild bother for him.\par \par With regard to his bladder cancer history, I will collect urine for cytology.  I have recommended to him that we do pursue additional surveillance cystoscopy if he does not show any evidence of recurrent hematuria in the future.  I will continue to reassess him on an annual basis for that reason.\par \par We also again reviewed smoking cessation today as an important part of his overall health as well as to reduce the risk of bladder cancer recurrence.  We spent about 5 minutes separately in this discussion both regarding the bladder cancer relevance as well as for his overall cardiovascular health.

## 2023-02-24 NOTE — HISTORY OF PRESENT ILLNESS
[FreeTextEntry1] : Johan Goins He is a 77-year-old man with history of bladder cancer.  He has not had any evidence of recurrent disease for over 10 years at this point.  I had performed a cystoscopy last year which we had discussed would be likely his last needed cystoscopy unless there was new cause to pursue further surveillance follow-up.  He does continue to smoke although he has reduced his cigarette use.  He currently uses about 5 cigarettes/day by his report.  He has not seen any recurrence of hematuria.  There is no dysuria.  He reports his urine is clear and yellow.  He notes excellent urinary stream post transurethral resection of the prostate done in 2021.  He does have some very mild stress incontinence that occurs with Valsalva.  This is an uncommon event and he does not require any pads or diapers.

## 2023-02-28 ENCOUNTER — NON-APPOINTMENT (OUTPATIENT)
Age: 78
End: 2023-02-28

## 2023-02-28 LAB — URINE CYTOLOGY: NORMAL

## 2023-03-07 ENCOUNTER — APPOINTMENT (OUTPATIENT)
Dept: INTERNAL MEDICINE | Facility: CLINIC | Age: 78
End: 2023-03-07
Payer: MEDICARE

## 2023-03-07 VITALS
OXYGEN SATURATION: 96 % | SYSTOLIC BLOOD PRESSURE: 136 MMHG | WEIGHT: 180 LBS | HEART RATE: 72 BPM | DIASTOLIC BLOOD PRESSURE: 68 MMHG | TEMPERATURE: 97.2 F | BODY MASS INDEX: 27.28 KG/M2 | HEIGHT: 68 IN

## 2023-03-07 DIAGNOSIS — Z87.891 PERSONAL HISTORY OF NICOTINE DEPENDENCE: ICD-10-CM

## 2023-03-07 PROCEDURE — 99072 ADDL SUPL MATRL&STAF TM PHE: CPT

## 2023-03-07 PROCEDURE — 99213 OFFICE O/P EST LOW 20 MIN: CPT | Mod: 25

## 2023-03-07 PROCEDURE — 36415 COLL VENOUS BLD VENIPUNCTURE: CPT

## 2023-03-08 PROBLEM — Z87.891 QUIT SMOKING: Status: ACTIVE | Noted: 2022-09-14

## 2023-03-08 NOTE — COUNSELING
[Yes] : Willing to quit smoking [Encouraged to increase physical activity] : Encouraged to increase physical activity [Weigh Self Weekly] : weigh self weekly [Decrease Portions] : decrease portions

## 2023-03-08 NOTE — PHYSICAL EXAM
[No Acute Distress] : no acute distress [Well Nourished] : well nourished [Well Developed] : well developed [No Respiratory Distress] : no respiratory distress  [No Accessory Muscle Use] : no accessory muscle use [Normal Rate] : normal rate  [Regular Rhythm] : with a regular rhythm [Normal S1, S2] : normal S1 and S2 [Pedal Pulses Present] : the pedal pulses are present [No Edema] : there was no peripheral edema [Alert and Oriented x3] : oriented to person, place, and time [Normal Mood] : the mood was normal [Comprehensive Foot Exam Normal] : Right and left foot were examined and both feet are normal. No ulcers in either foot. Toes are normal and with full ROM.  Normal tactile sensation with monofilament testing throughout both feet

## 2023-03-08 NOTE — HISTORY OF PRESENT ILLNESS
[FreeTextEntry1] : f/u with DM [de-identified] : Pt reports feeling well today.\par He reports decreasing amount of smoking to 3-4 cig.daily.\par No c/p,palp.SOB

## 2023-03-10 LAB
ALBUMIN SERPL ELPH-MCNC: 4.3 G/DL
ALP BLD-CCNC: 73 U/L
ALT SERPL-CCNC: 14 U/L
ANION GAP SERPL CALC-SCNC: 15 MMOL/L
AST SERPL-CCNC: 16 U/L
BILIRUB SERPL-MCNC: 0.5 MG/DL
BUN SERPL-MCNC: 21 MG/DL
CALCIUM SERPL-MCNC: 10.3 MG/DL
CHLORIDE SERPL-SCNC: 106 MMOL/L
CO2 SERPL-SCNC: 26 MMOL/L
CREAT SERPL-MCNC: 0.78 MG/DL
EGFR: 91 ML/MIN/1.73M2
ESTIMATED AVERAGE GLUCOSE: 148 MG/DL
GLUCOSE SERPL-MCNC: 155 MG/DL
HBA1C MFR BLD HPLC: 6.8 %
POTASSIUM SERPL-SCNC: 4.6 MMOL/L
PROT SERPL-MCNC: 7.3 G/DL
SODIUM SERPL-SCNC: 146 MMOL/L

## 2023-03-21 ENCOUNTER — APPOINTMENT (OUTPATIENT)
Dept: CARDIOLOGY | Facility: CLINIC | Age: 78
End: 2023-03-21
Payer: MEDICARE

## 2023-03-21 VITALS
HEART RATE: 60 BPM | WEIGHT: 180 LBS | HEIGHT: 68 IN | BODY MASS INDEX: 27.28 KG/M2 | DIASTOLIC BLOOD PRESSURE: 62 MMHG | OXYGEN SATURATION: 96 % | SYSTOLIC BLOOD PRESSURE: 127 MMHG

## 2023-03-21 DIAGNOSIS — Z83.6 FAMILY HISTORY OF OTHER DISEASES OF THE RESPIRATORY SYSTEM: ICD-10-CM

## 2023-03-21 PROCEDURE — 99072 ADDL SUPL MATRL&STAF TM PHE: CPT

## 2023-03-21 PROCEDURE — 99214 OFFICE O/P EST MOD 30 MIN: CPT

## 2023-03-21 NOTE — REASON FOR VISIT
[Follow-Up - Clinic] : a clinic follow-up of [FreeTextEntry2] : PAD [FreeTextEntry1] : 3/21/2023\par Doing better.\par Now walking 10 blocks before onset of symptoms.\par Reports breathing well. No C/P. No LE wounds. \par \par Carotid artery duplex 3/51560\par No significant hemodynamic stenosis of either carotid artery.\par \par LE arterial duplex 3/2022\par Diffuse atherosclerotic changes. Previously seen elevated velocities in the right common femoral artery not appreciated in today's exams. No left femoral artery focal stenosis can be seen. Left lower extremity shows monophasic waveforms in the below knee vasculature\par \par Medications:\par Metformin\par Losartan 100 mg daily\par Norvasc 5 mg daily\par Plavix\par Lipitor 10 mg daily\par Metoprolol XL 25 mg daily\par ASA 81 mg daily\par \par \par 3/18/2022\par Doing well\par Walking 5 blocks. \par He does have mild claudication on the left. \par no sig claudication on the right.  \par \par Pt is s/p R SFA balloon angioplasty on 12/2/20 by Dr. Atkins.  \par He is on aspirin and plavix\par  Not walking much during the winter\par Able to all his house chores

## 2023-03-21 NOTE — PHYSICAL EXAM
[Auscultation Breath Sounds / Voice Sounds] : lungs were clear to auscultation bilaterally [Heart Rate And Rhythm] : heart rate and rhythm were normal [Edema] : no peripheral edema present [Bowel Sounds] : normal bowel sounds [Abdomen Soft] : soft [Abdomen Tenderness] : non-tender [Abnormal Walk] : normal gait [Cyanosis, Localized] : no localized cyanosis [] : no rash [No Skin Ulcers] : no skin ulcer [Oriented To Time, Place, And Person] : oriented to person, place, and time [FreeTextEntry1] : R DP and PT palpable; audible L DP and PT

## 2023-03-21 NOTE — DISCUSSION/SUMMARY
[FreeTextEntry1] : 1. On ASA, Plavix, Statin\par 2. Continue walking therapy, 30 - 45 minutes x 5 times per week\par 3. Follow-up with spinal MD for sciatica.\par     Can hold Plavix for 7 days prior if steroid injections are needed. \par 4. For FIONA and repeat TTE, follow-up with Primary Cardiologist. \par 5. Stop smoked 4-5 months ago. Continue. \par 6. Come back in 12 months for routine follow-up. Call if any questions.

## 2023-03-21 NOTE — ASSESSMENT
[FreeTextEntry1] : Assessment:\par 1. PAD s/p R SFA PTA\par     Mar[place] Claudication 2\par 2. HTN\par 3. CAD s/p PCI\par 4. HLD\par 5. Sciatica\par

## 2023-09-13 ENCOUNTER — APPOINTMENT (OUTPATIENT)
Dept: INTERNAL MEDICINE | Facility: CLINIC | Age: 78
End: 2023-09-13

## 2023-11-07 ENCOUNTER — APPOINTMENT (OUTPATIENT)
Dept: INTERNAL MEDICINE | Facility: CLINIC | Age: 78
End: 2023-11-07
Payer: MEDICARE

## 2023-11-07 VITALS
SYSTOLIC BLOOD PRESSURE: 134 MMHG | OXYGEN SATURATION: 94 % | BODY MASS INDEX: 28.04 KG/M2 | HEART RATE: 74 BPM | WEIGHT: 185 LBS | HEIGHT: 68 IN | DIASTOLIC BLOOD PRESSURE: 58 MMHG

## 2023-11-07 DIAGNOSIS — Z00.00 ENCOUNTER FOR GENERAL ADULT MEDICAL EXAMINATION W/OUT ABNORMAL FINDINGS: ICD-10-CM

## 2023-11-07 DIAGNOSIS — I25.10 ATHEROSCLEROTIC HEART DISEASE OF NATIVE CORONARY ARTERY W/OUT ANGINA PECTORIS: ICD-10-CM

## 2023-11-07 DIAGNOSIS — E11.9 TYPE 2 DIABETES MELLITUS W/OUT COMPLICATIONS: ICD-10-CM

## 2023-11-07 DIAGNOSIS — I10 ESSENTIAL (PRIMARY) HYPERTENSION: ICD-10-CM

## 2023-11-07 DIAGNOSIS — E78.5 HYPERLIPIDEMIA, UNSPECIFIED: ICD-10-CM

## 2023-11-07 PROCEDURE — 36415 COLL VENOUS BLD VENIPUNCTURE: CPT

## 2023-11-07 PROCEDURE — 93000 ELECTROCARDIOGRAM COMPLETE: CPT

## 2023-11-07 PROCEDURE — G0439: CPT

## 2023-11-08 PROBLEM — I25.10 CAD (CORONARY ARTERY DISEASE): Status: ACTIVE | Noted: 2018-02-01

## 2023-11-14 DIAGNOSIS — D50.8 OTHER IRON DEFICIENCY ANEMIAS: ICD-10-CM

## 2023-11-14 LAB
25(OH)D3 SERPL-MCNC: 28.9 NG/ML
ALBUMIN SERPL ELPH-MCNC: 3.9 G/DL
ALP BLD-CCNC: 169 U/L
ALT SERPL-CCNC: 32 U/L
ANION GAP SERPL CALC-SCNC: 13 MMOL/L
AST SERPL-CCNC: 15 U/L
BILIRUB SERPL-MCNC: 0.6 MG/DL
BUN SERPL-MCNC: 23 MG/DL
CALCIUM SERPL-MCNC: 10.1 MG/DL
CHLORIDE SERPL-SCNC: 103 MMOL/L
CHOLEST SERPL-MCNC: 103 MG/DL
CO2 SERPL-SCNC: 26 MMOL/L
CREAT SERPL-MCNC: 0.99 MG/DL
EGFR: 77 ML/MIN/1.73M2
ESTIMATED AVERAGE GLUCOSE: 154 MG/DL
GLUCOSE SERPL-MCNC: 155 MG/DL
HBA1C MFR BLD HPLC: 7 %
HCT VFR BLD CALC: 37.7 %
HDLC SERPL-MCNC: 27 MG/DL
HGB BLD-MCNC: 11.6 G/DL
LDLC SERPL CALC-MCNC: 54 MG/DL
MCHC RBC-ENTMCNC: 29.4 PG
MCHC RBC-ENTMCNC: 30.8 GM/DL
MCV RBC AUTO: 95.4 FL
NONHDLC SERPL-MCNC: 76 MG/DL
PLATELET # BLD AUTO: 226 K/UL
POTASSIUM SERPL-SCNC: 5 MMOL/L
PROT SERPL-MCNC: 7.2 G/DL
PSA SERPL-MCNC: 1.32 NG/ML
RBC # BLD: 3.95 M/UL
RBC # FLD: 14.4 %
SODIUM SERPL-SCNC: 142 MMOL/L
T3 SERPL-MCNC: 117 NG/DL
T4 SERPL-MCNC: 6.8 UG/DL
TRIGL SERPL-MCNC: 116 MG/DL
TSH SERPL-ACNC: 5.12 UIU/ML
WBC # FLD AUTO: 9.29 K/UL

## 2023-11-14 RX ORDER — FERROUS SULFATE 325(65) MG
325 (65 FE) TABLET ORAL DAILY
Qty: 30 | Refills: 3 | Status: ACTIVE | COMMUNITY
Start: 2023-11-14 | End: 1900-01-01

## 2023-12-31 PROBLEM — Z23 NEED FOR VACCINATION WITH 13-POLYVALENT PNEUMOCOCCAL CONJUGATE VACCINE: Status: ACTIVE | Noted: 2018-02-01

## 2024-02-20 PROBLEM — Z12.5 PROSTATE CANCER SCREENING: Status: ACTIVE | Noted: 2024-02-20

## 2024-02-20 PROBLEM — Z87.448 HISTORY OF HEMATURIA: Status: RESOLVED | Noted: 2018-09-06 | Resolved: 2024-02-20

## 2024-02-22 ENCOUNTER — APPOINTMENT (OUTPATIENT)
Dept: UROLOGY | Facility: CLINIC | Age: 79
End: 2024-02-22
Payer: MEDICARE

## 2024-02-22 VITALS
OXYGEN SATURATION: 98 % | DIASTOLIC BLOOD PRESSURE: 73 MMHG | WEIGHT: 186 LBS | HEART RATE: 53 BPM | SYSTOLIC BLOOD PRESSURE: 144 MMHG | HEIGHT: 68 IN | BODY MASS INDEX: 28.19 KG/M2

## 2024-02-22 DIAGNOSIS — Z12.5 ENCOUNTER FOR SCREENING FOR MALIGNANT NEOPLASM OF PROSTATE: ICD-10-CM

## 2024-02-22 DIAGNOSIS — N13.8 BENIGN PROSTATIC HYPERPLASIA WITH LOWER URINARY TRACT SYMPMS: ICD-10-CM

## 2024-02-22 DIAGNOSIS — Z86.03 PERSONAL HISTORY OF NEOPLASM OF UNCERTAIN BEHAVIOR: ICD-10-CM

## 2024-02-22 DIAGNOSIS — Z87.448 PERSONAL HISTORY OF OTHER DISEASES OF URINARY SYSTEM: ICD-10-CM

## 2024-02-22 DIAGNOSIS — Z87.898 PERSONAL HISTORY OF OTHER SPECIFIED CONDITIONS: ICD-10-CM

## 2024-02-22 DIAGNOSIS — N40.1 BENIGN PROSTATIC HYPERPLASIA WITH LOWER URINARY TRACT SYMPMS: ICD-10-CM

## 2024-02-22 DIAGNOSIS — Z85.51 PERSONAL HISTORY OF MALIGNANT NEOPLASM OF BLADDER: ICD-10-CM

## 2024-02-22 DIAGNOSIS — N40.0 BENIGN PROSTATIC HYPERPLASIA WITHOUT LOWER URINARY TRACT SYMPMS: ICD-10-CM

## 2024-02-22 PROCEDURE — 99214 OFFICE O/P EST MOD 30 MIN: CPT

## 2024-02-22 PROCEDURE — G2211 COMPLEX E/M VISIT ADD ON: CPT

## 2024-02-24 NOTE — ASSESSMENT
[FreeTextEntry1] : His PSA level was recently checked in November 2023 which was 1.32 ng/mL at that time.  I would recommend that he can discontinue prostate cancer screening moving forward given his very low level, low risk of harboring clinically significant prostate cancer and advancing age such that he now falls out of the typical age recommendations per screening guidelines for prostate cancer.  With regard to his use of tamsulosin recommended at the time of a cardiac procedure, I do not think he needs to use this as he does not have any obstructive urinary symptoms and I think he can reduce his overall medication burden safely.  With regard to the nocturia, we discussed trying to minimize nighttime fluid intake when possible to minimize the nocturia.  I do not think he needs to consider any additional medical treatment or surgical treatment at this time as his degree of bother from this is quite minimal.

## 2024-02-24 NOTE — HISTORY OF PRESENT ILLNESS
[FreeTextEntry1] : Johan Goins returns to the office today.  He was last here about a year ago and reported history of bladder cancer and had undergone more than 10 years of cystoscopic follow-up without any evidence of recurrence.  Because of the lack of any recurrent disease over such a lengthy period we had discussed that he could avoid the need for additional surveillance cystoscopies unless there were any new onset symptoms such as hematuria or change in voiding patterns.  He also has undergone a transurethral resection of the prostate to alleviate bladder outlet obstruction from BPH.  That surgery was done in 2021.  He has continued to experience an excellent urinary flow after the procedure and has very occasional stress incontinence occurring with Valsalva but minimal and not requiring any pads or diapers.  Patient reports nocturia 3x but used to be 1-2x before the TURP. Nocturia depends on when he goes to bed (duration of sleep). Occasional stress incontinence got better after the TURP (fully resolved). No UUI or JORDI currently.  Patient has a cardiac procedure done about 3 months ago at [facility] where they specified that he had an enlarged prostate and prescribed him 0.4mg of tamsulosin that he takes at night.

## 2024-03-20 ENCOUNTER — APPOINTMENT (OUTPATIENT)
Dept: CARDIOLOGY | Facility: CLINIC | Age: 79
End: 2024-03-20
Payer: MEDICARE

## 2024-03-20 VITALS
HEART RATE: 52 BPM | HEIGHT: 68 IN | SYSTOLIC BLOOD PRESSURE: 160 MMHG | DIASTOLIC BLOOD PRESSURE: 70 MMHG | BODY MASS INDEX: 28.19 KG/M2 | WEIGHT: 186 LBS | OXYGEN SATURATION: 97 %

## 2024-03-20 DIAGNOSIS — Z87.891 PERSONAL HISTORY OF NICOTINE DEPENDENCE: ICD-10-CM

## 2024-03-20 DIAGNOSIS — Z87.01 PERSONAL HISTORY OF PNEUMONIA (RECURRENT): ICD-10-CM

## 2024-03-20 DIAGNOSIS — I73.9 PERIPHERAL VASCULAR DISEASE, UNSPECIFIED: ICD-10-CM

## 2024-03-20 PROCEDURE — 99214 OFFICE O/P EST MOD 30 MIN: CPT

## 2024-03-20 PROCEDURE — G2211 COMPLEX E/M VISIT ADD ON: CPT

## 2024-03-20 NOTE — ASSESSMENT
[FreeTextEntry1] : Assessment: 1. PAD s/p R SFA PTA     [place] Claudication 2 2. HTN 3. CAD s/p PCI 4. HLD 5. Sciatica

## 2024-03-20 NOTE — REASON FOR VISIT
[Follow-Up - Clinic] : a clinic follow-up of [FreeTextEntry2] : PAD [FreeTextEntry1] : 3/2024  Since last visit patient reports claudication in R greater than L LE on ambulation of 5 blocks. Reports sciatica and back pain.  Reports TAVR several months ago at [facility]. LDL 54 in November.  Medications: Metformin Losartan 100 mg daily Norvasc 5 mg daily Plavix Lipitor 10 mg daily Metoprolol XL 25 mg daily ASA 81 mg daily  3/21/2023 Doing better. Now walking 10 blocks before onset of symptoms. Reports breathing well. No C/P. No LE wounds.   Carotid artery duplex 3/88315 No significant hemodynamic stenosis of either carotid artery.  LE arterial duplex 3/2022 Diffuse atherosclerotic changes. Previously seen elevated velocities in the right common femoral artery not appreciated in today's exams. No left femoral artery focal stenosis can be seen. Left lower extremity shows monophasic waveforms in the below knee vasculature  Medications: Metformin Losartan 100 mg daily Norvasc 5 mg daily Plavix Lipitor 10 mg daily Metoprolol XL 25 mg daily ASA 81 mg daily   3/18/2022 Doing well Walking 5 blocks.  He does have mild claudication on the left.  no sig claudication on the right.    Pt is s/p R SFA balloon angioplasty on 12/2/20 by Dr. Atkins.   He is on aspirin and plavix  Not walking much during the winter Able to all his house chores

## 2024-03-20 NOTE — PHYSICAL EXAM
[Auscultation Breath Sounds / Voice Sounds] : lungs were clear to auscultation bilaterally [Heart Rate And Rhythm] : heart rate and rhythm were normal [Edema] : no peripheral edema present [Bowel Sounds] : normal bowel sounds [Abdomen Soft] : soft [Abdomen Tenderness] : non-tender [Abnormal Walk] : normal gait [Cyanosis, Localized] : no localized cyanosis [] : no rash [Oriented To Time, Place, And Person] : oriented to person, place, and time [No Skin Ulcers] : no skin ulcer [FreeTextEntry1] : R DP and PT palpable; audible L DP and PT

## 2024-03-20 NOTE — DISCUSSION/SUMMARY
[FreeTextEntry1] : 1. On ASA, Plavix, Statin 2. Continue walking therapy, 30 - 45 minutes x 5 times per week 3. Follow-up with spinal MD for sciatica. 4. Follow-up with Primary Cardiologist.  5. Stop smoking. Continue.  6. Repeat LE arterial duplex.  7. Daily foot exam. 8. Come back in 12 months for routine follow-up. Call if any questions.      I, Dr. Juan Luis Polanco, personally performed the evaluation and management (E/M) services for this established patient who presents today.  That E/M includes conducting the examination, assessing all new/exacerbated conditions, and establishing a new plan of care.  Today, my ACP, Mónica Jaramillo, was here to observe my evaluation and management services for this new problem/exacerbated condition to be followed going forward.

## 2024-03-22 ENCOUNTER — RESULT REVIEW (OUTPATIENT)
Age: 79
End: 2024-03-22

## 2024-03-22 ENCOUNTER — OUTPATIENT (OUTPATIENT)
Dept: OUTPATIENT SERVICES | Facility: HOSPITAL | Age: 79
LOS: 1 days | End: 2024-03-22
Payer: MEDICARE

## 2024-03-22 ENCOUNTER — APPOINTMENT (OUTPATIENT)
Dept: CV DIAGNOSITCS | Facility: HOSPITAL | Age: 79
End: 2024-03-22

## 2024-03-22 DIAGNOSIS — I73.9 PERIPHERAL VASCULAR DISEASE, UNSPECIFIED: ICD-10-CM

## 2024-03-22 DIAGNOSIS — Z98.890 OTHER SPECIFIED POSTPROCEDURAL STATES: Chronic | ICD-10-CM

## 2024-03-22 PROCEDURE — 93925 LOWER EXTREMITY STUDY: CPT | Mod: 26

## 2025-02-27 ENCOUNTER — APPOINTMENT (OUTPATIENT)
Dept: UROLOGY | Facility: CLINIC | Age: 80
End: 2025-02-27

## 2025-03-21 ENCOUNTER — APPOINTMENT (OUTPATIENT)
Dept: CARDIOLOGY | Facility: CLINIC | Age: 80
End: 2025-03-21